# Patient Record
Sex: MALE | Race: WHITE | NOT HISPANIC OR LATINO | URBAN - METROPOLITAN AREA
[De-identification: names, ages, dates, MRNs, and addresses within clinical notes are randomized per-mention and may not be internally consistent; named-entity substitution may affect disease eponyms.]

---

## 2017-07-12 ENCOUNTER — OUTPATIENT (OUTPATIENT)
Dept: OUTPATIENT SERVICES | Facility: HOSPITAL | Age: 82
LOS: 1 days | End: 2017-07-12
Payer: COMMERCIAL

## 2017-07-12 DIAGNOSIS — R07.9 CHEST PAIN, UNSPECIFIED: ICD-10-CM

## 2017-07-12 PROCEDURE — 78452 HT MUSCLE IMAGE SPECT MULT: CPT

## 2017-07-12 PROCEDURE — 93017 CV STRESS TEST TRACING ONLY: CPT

## 2017-07-12 PROCEDURE — A9500: CPT

## 2017-07-12 PROCEDURE — 93018 CV STRESS TEST I&R ONLY: CPT

## 2017-07-12 PROCEDURE — 78452 HT MUSCLE IMAGE SPECT MULT: CPT | Mod: 26

## 2017-07-12 PROCEDURE — 93016 CV STRESS TEST SUPVJ ONLY: CPT

## 2018-02-05 PROBLEM — Z00.00 ENCOUNTER FOR PREVENTIVE HEALTH EXAMINATION: Status: ACTIVE | Noted: 2018-02-05

## 2018-03-15 ENCOUNTER — APPOINTMENT (OUTPATIENT)
Dept: ELECTROPHYSIOLOGY | Facility: CLINIC | Age: 83
End: 2018-03-15
Payer: MEDICARE

## 2018-03-15 ENCOUNTER — NON-APPOINTMENT (OUTPATIENT)
Age: 83
End: 2018-03-15

## 2018-03-15 VITALS
WEIGHT: 170 LBS | DIASTOLIC BLOOD PRESSURE: 75 MMHG | HEIGHT: 70 IN | BODY MASS INDEX: 24.34 KG/M2 | HEART RATE: 53 BPM | SYSTOLIC BLOOD PRESSURE: 138 MMHG | OXYGEN SATURATION: 95 %

## 2018-03-15 DIAGNOSIS — R55 SYNCOPE AND COLLAPSE: ICD-10-CM

## 2018-03-15 DIAGNOSIS — R42 DIZZINESS AND GIDDINESS: ICD-10-CM

## 2018-03-15 DIAGNOSIS — Z86.39 PERSONAL HISTORY OF OTHER ENDOCRINE, NUTRITIONAL AND METABOLIC DISEASE: ICD-10-CM

## 2018-03-15 DIAGNOSIS — Z78.9 OTHER SPECIFIED HEALTH STATUS: ICD-10-CM

## 2018-03-15 DIAGNOSIS — Z87.891 PERSONAL HISTORY OF NICOTINE DEPENDENCE: ICD-10-CM

## 2018-03-15 DIAGNOSIS — Z86.61 PERSONAL HISTORY OF INFECTIONS OF THE CENTRAL NERVOUS SYSTEM: ICD-10-CM

## 2018-03-15 PROCEDURE — 99205 OFFICE O/P NEW HI 60 MIN: CPT

## 2018-03-15 PROCEDURE — 93000 ELECTROCARDIOGRAM COMPLETE: CPT

## 2018-05-04 ENCOUNTER — OUTPATIENT (OUTPATIENT)
Dept: OUTPATIENT SERVICES | Facility: HOSPITAL | Age: 83
LOS: 1 days | Discharge: HOME | End: 2018-05-04
Payer: COMMERCIAL

## 2018-05-04 ENCOUNTER — TRANSCRIPTION ENCOUNTER (OUTPATIENT)
Age: 83
End: 2018-05-04

## 2018-05-04 VITALS
SYSTOLIC BLOOD PRESSURE: 126 MMHG | DIASTOLIC BLOOD PRESSURE: 65 MMHG | RESPIRATION RATE: 16 BRPM | HEART RATE: 67 BPM | OXYGEN SATURATION: 98 % | TEMPERATURE: 98 F

## 2018-05-04 DIAGNOSIS — R55 SYNCOPE AND COLLAPSE: ICD-10-CM

## 2018-05-04 PROCEDURE — C1764: CPT

## 2018-05-04 PROCEDURE — 33282: CPT

## 2018-05-04 RX ORDER — CEPHALEXIN 500 MG
1 CAPSULE ORAL
Qty: 0 | Refills: 0 | COMMUNITY
Start: 2018-05-04

## 2018-05-04 RX ORDER — CEPHALEXIN 500 MG
500 CAPSULE ORAL ONCE
Qty: 0 | Refills: 0 | Status: COMPLETED | OUTPATIENT
Start: 2018-05-04 | End: 2018-05-04

## 2018-05-04 RX ADMIN — Medication 500 MILLIGRAM(S): at 08:30

## 2018-05-04 NOTE — DISCHARGE NOTE ADULT - HOSPITAL COURSE
82 year old gentleman with history of DM, HTN, CKD stage II, prior meningitis 2yrs ago, RA on prednisone, chronic LE edema/lymphedema, presenting for evaluation of syncope and recurrent near syncope. He describes symptoms onset with pressure in his head, and one episode of william syncope. No arrhythmias were identified on initial workup, but subsequently Holter revealed brief NSVT and runs of atypical SVT though this did not correlate with symptoms. He presented electively 5/4/18 and         ILR implant for longitudinal rhythm monitoring and symptom correlation. 82 year old gentleman with history of DM, HTN, CKD stage II, prior meningitis 2yrs ago, RA on prednisone, chronic LE edema/lymphedema, presenting for evaluation of syncope and recurrent near syncope. He describes symptoms onset with pressure in his head, and one episode of william syncope. No arrhythmias were identified on initial workup, but subsequently Holter revealed brief NSVT and runs of atypical SVT though this did not correlate with symptoms. He presented electively 5/4/18 and underwent ILR implant for longitudinal rhythm monitoring and symptom correlation. 82 year old gentleman with history of DM, HTN, CKD stage II, prior meningitis 2yrs ago, RA on prednisone, chronic LE edema/lymphedema, presenting for evaluation of syncope and recurrent near syncope. He describes symptoms onset with pressure in his head, and one episode of william syncope. No arrhythmias were identified on initial workup, but subsequently Holter revealed brief NSVT and runs of atypical SVT though this did not correlate with symptoms. He presented electively 5/4/18 and underwent uncomplicated ILR implant for longitudinal rhythm monitoring and symptom correlation. The patient was observed per post procedure protocol and discharged home with a plan for outpatient follow up.

## 2018-05-04 NOTE — DISCHARGE NOTE ADULT - PLAN OF CARE
rhythm monitoring and symptomatic correlation Loop Recorder Incision Care:     - Remove the plastic and gauze dressing after 24 hours.   - Do not touch the incision until it is completely healed.   - There is Dermabond (skin glue) on your incision, which will start to flake off on its own over the next 2-3 weeks. Do not pick at or peal off the Dermabond.   - Do not apply soaps, creams, lotions, ointments or powders to the incision until it is completely healed.  - You should call the doctor if you notice redness, drainage, swelling, increased tenderness, hot sensation around the  incision, bleeding or incision edges pulling apart.

## 2018-05-04 NOTE — H&P PST ADULT - ASSESSMENT
82 year old gentleman with history of DM, HTN, CKD stage II, prior meningitis 2yrs ago, RA on prednisone, chronic LE edema/lymphedema, presenting for evaluation of syncope and recurrent near syncope. He describes symptoms onset with pressure in his head, and one episode of william syncope. No arrhythmias were identified on initial workup, but subsequently Holter revealed brief NSVT and runs of atypical SVT though this did not correlate with symptoms. He presents in anticipation of ILR implant for longitudinal rhythm monitoring and symptom correlation.  Plan:   Keflex 500mg PO x 1 dose now.   Consent w/ Dr. Hodge.   Chest prep per protocol.   Discharge teaching initiated.

## 2018-05-04 NOTE — H&P PST ADULT - PMH
Chronic back pain    Diabetes    Enlarged prostate    GERD (gastroesophageal reflux disease)    High cholesterol    History of hypertension    Nerve damage    Peripheral neuropathy    Rheumatoid arthritis    Skin cancer    Syncope, unspecified syncope type

## 2018-05-04 NOTE — DISCHARGE NOTE ADULT - PATIENT PORTAL LINK FT
You can access the [facility] Patient Portal, offered by [facility], by registering with the following website: [email or web address]

## 2018-05-04 NOTE — DISCHARGE NOTE ADULT - CARE PROVIDERS DIRECT ADDRESSES
[email or web address]

## 2018-05-04 NOTE — DISCHARGE NOTE ADULT - CARE PROVIDER_API CALL
Kenny Hodge), Cardiology; Internal Medicine  [address] [place] [address]  Phone: [phone]  Fax: [phone]

## 2018-05-04 NOTE — DISCHARGE NOTE ADULT - CARE PLAN
Principal Discharge DX:	Syncope, unspecified syncope type  Goal:	rhythm monitoring and symptomatic correlation  Assessment and plan of treatment:	Loop Recorder Incision Care:     - Remove the plastic and gauze dressing after 24 hours.   - Do not touch the incision until it is completely healed.   - There is Dermabond (skin glue) on your incision, which will start to flake off on its own over the next 2-3 weeks. Do not pick at or peal off the Dermabond.   - Do not apply soaps, creams, lotions, ointments or powders to the incision until it is completely healed.  - You should call the doctor if you notice redness, drainage, swelling, increased tenderness, hot sensation around the  incision, bleeding or incision edges pulling apart.

## 2018-05-04 NOTE — DISCHARGE NOTE ADULT - INSTRUCTIONS
Choose lean meats and poultry without skin and prepare them without added saturated and trans fat.  Eat fish at least twice a week. Recent research shows that eating oily fish containing omega-3 fatty acids (for example, salmon, trout and herring) may help lower your risk of death from coronary artery disease.  Select fat-free, 1 percent fat and low-fat dairy products.  Cut back on foods containing partially hydrogenated vegetable oils to reduce trans fat in your diet.   To lower cholesterol, reduce saturated fat to no more than 5 to 6 percent of total calories. For someone eating 2,000 calories a day, that’s about 13 grams of saturated fat.  Cut back on beverages and foods with added sugars.  Choose and prepare foods with little or no salt. To lower blood pressure, aim to eat no more than 2,400 milligrams of sodium per day. Reducing daily intake to 1,500 mg is desirable because it can lower blood pressure even further.  If you drink alcohol, drink in moderation. That means one drink per day if you’re a woman and two drinks  per day if you’re a man.  Follow the American Heart Association recommendations when you eat out, and keep an eye on your portion sizes.   With a diagnosis of diabetes comes a strict diet regimen to help control blood sugars by watching carbohydrate consumption. Carbohydrates, such as starches, fruits, dairy and starchy vegetables, is the food group that affects glucose levels in the body. Carefully monitoring carbohydrate intake is a main component of the diabetic diet; eating three meals each day that are roughly equivalent in size can help control blood sugars. A registered dietitian can help you plan a diet customized to your individual needs.

## 2018-05-04 NOTE — H&P PST ADULT - HISTORY OF PRESENT ILLNESS
82 year old gentleman with history of DM, HTN, CKD stage II, prior meningitis 2yrs ago, RA on prednisone, chronic LE edema/lymphedema, presenting for evaluation of syncope. He had an episode of syncope in 10/2017, at which time he developed sudden pressure in his head, then fell over with loss of consciousness. He does remember falling, but believes he did pass out. He underwent workup at [facility], where TTE and Carotid ultrasound was unremarkable. Head CT revealed white matter disease and age appropriate changes. Since that time he has had recurrent episodes of sudden pressure in his head with a presyncope feeling where he has to sit down, but the symptoms resolve in about 5 minutes. He denies any palpitation, lightheadedness or other syncope. He does feel mildly lightheaded when he stands up quickly.     Holter monitor 1/18/18 revealed sinus rhythm  bpm, with rare PVCs and one run of NSVT for 3 beats at 171 bpm, and frequent runs of SVT up to 44 beats at 143 bpm. The SVT episodes were regular long RP tachycardia with superiorly directed P-wave axis. He denies any symptoms while wearing the monitor.     Of note he is on multiple medications for neuropathy including lyrica, and recently started duloxetine. He also takes Lasix which was started for LE edema, which has been very mild. He has not had neurologic evaluation since his meningitis episode. He presents in anticipation of ILR implant for longitudinal rhythm monitoring and symptom correlation. Patient currently denies chest pain, shortness of breath at rest or with exertion, palpitation, fevers/chills, N/V/D, or other cardiac or constitutional symptoms.     Stress Test: 7/12/17, normal, unable to exercise/ walks with walker. Small mild defect in basal inferior wall predominantly fixed suggestive of attenuation artifact. LVEF 59%.   Echo: 12/13/17, LVEF 60%, nml biatrial size, mild MR, mod TR

## 2018-05-17 ENCOUNTER — APPOINTMENT (OUTPATIENT)
Dept: ELECTROPHYSIOLOGY | Facility: CLINIC | Age: 83
End: 2018-05-17
Payer: MEDICARE

## 2018-05-17 VITALS
HEIGHT: 70 IN | HEART RATE: 80 BPM | BODY MASS INDEX: 26.2 KG/M2 | DIASTOLIC BLOOD PRESSURE: 78 MMHG | WEIGHT: 183 LBS | SYSTOLIC BLOOD PRESSURE: 134 MMHG

## 2018-05-17 DIAGNOSIS — Z95.818 PRESENCE OF OTHER CARDIAC IMPLANTS AND GRAFTS: ICD-10-CM

## 2018-05-17 DIAGNOSIS — I47.2 VENTRICULAR TACHYCARDIA: ICD-10-CM

## 2018-05-17 DIAGNOSIS — Z86.79 PERSONAL HISTORY OF OTHER DISEASES OF THE CIRCULATORY SYSTEM: ICD-10-CM

## 2018-05-17 PROCEDURE — 99024 POSTOP FOLLOW-UP VISIT: CPT

## 2018-06-11 ENCOUNTER — APPOINTMENT (OUTPATIENT)
Age: 83
End: 2018-06-11
Payer: MEDICARE

## 2018-06-11 PROCEDURE — 93299: CPT

## 2018-06-11 PROCEDURE — 93298 REM INTERROG DEV EVAL SCRMS: CPT

## 2018-06-20 ENCOUNTER — INPATIENT (INPATIENT)
Facility: HOSPITAL | Age: 83
LOS: 1 days | Discharge: HOME HEALTH | DRG: 683 | End: 2018-06-22
Attending: INTERNAL MEDICINE | Admitting: INTERNAL MEDICINE
Payer: COMMERCIAL

## 2018-06-20 VITALS
TEMPERATURE: 98 F | HEIGHT: 67 IN | DIASTOLIC BLOOD PRESSURE: 68 MMHG | WEIGHT: 164.91 LBS | HEART RATE: 66 BPM | SYSTOLIC BLOOD PRESSURE: 100 MMHG | RESPIRATION RATE: 16 BRPM | OXYGEN SATURATION: 97 %

## 2018-06-20 DIAGNOSIS — J18.9 PNEUMONIA, UNSPECIFIED ORGANISM: ICD-10-CM

## 2018-06-20 LAB
ALBUMIN SERPL ELPH-MCNC: 3.8 G/DL — SIGNIFICANT CHANGE UP (ref 3.3–5.2)
ALP SERPL-CCNC: 53 U/L — SIGNIFICANT CHANGE UP (ref 40–120)
ALT FLD-CCNC: 6 U/L — SIGNIFICANT CHANGE UP
ANION GAP SERPL CALC-SCNC: 19 MMOL/L — HIGH (ref 5–17)
APPEARANCE UR: CLEAR — SIGNIFICANT CHANGE UP
AST SERPL-CCNC: 15 U/L — SIGNIFICANT CHANGE UP
BASOPHILS # BLD AUTO: 0.1 K/UL — SIGNIFICANT CHANGE UP (ref 0–0.2)
BASOPHILS NFR BLD AUTO: 0.6 % — SIGNIFICANT CHANGE UP (ref 0–2)
BILIRUB SERPL-MCNC: 0.3 MG/DL — LOW (ref 0.4–2)
BILIRUB UR-MCNC: NEGATIVE — SIGNIFICANT CHANGE UP
BUN SERPL-MCNC: 31 MG/DL — HIGH (ref 8–20)
CALCIUM SERPL-MCNC: 9.1 MG/DL — SIGNIFICANT CHANGE UP (ref 8.6–10.2)
CHLORIDE SERPL-SCNC: 96 MMOL/L — LOW (ref 98–107)
CO2 SERPL-SCNC: 22 MMOL/L — SIGNIFICANT CHANGE UP (ref 22–29)
COLOR SPEC: YELLOW — SIGNIFICANT CHANGE UP
CREAT SERPL-MCNC: 1.69 MG/DL — HIGH (ref 0.5–1.3)
DIFF PNL FLD: NEGATIVE — SIGNIFICANT CHANGE UP
EOSINOPHIL # BLD AUTO: 0.4 K/UL — SIGNIFICANT CHANGE UP (ref 0–0.5)
EOSINOPHIL NFR BLD AUTO: 3.5 % — SIGNIFICANT CHANGE UP (ref 0–6)
GLUCOSE BLDC GLUCOMTR-MCNC: 147 MG/DL — HIGH (ref 70–99)
GLUCOSE BLDC GLUCOMTR-MCNC: 188 MG/DL — HIGH (ref 70–99)
GLUCOSE SERPL-MCNC: 132 MG/DL — HIGH (ref 70–115)
GLUCOSE UR QL: NEGATIVE MG/DL — SIGNIFICANT CHANGE UP
HCT VFR BLD CALC: 34.5 % — LOW (ref 42–52)
HGB BLD-MCNC: 11.5 G/DL — LOW (ref 14–18)
KETONES UR-MCNC: ABNORMAL
LEUKOCYTE ESTERASE UR-ACNC: NEGATIVE — SIGNIFICANT CHANGE UP
LYMPHOCYTES # BLD AUTO: 1.8 K/UL — SIGNIFICANT CHANGE UP (ref 1–4.8)
LYMPHOCYTES # BLD AUTO: 16.6 % — LOW (ref 20–55)
MCHC RBC-ENTMCNC: 31.2 PG — HIGH (ref 27–31)
MCHC RBC-ENTMCNC: 33.3 G/DL — SIGNIFICANT CHANGE UP (ref 32–36)
MCV RBC AUTO: 93.5 FL — SIGNIFICANT CHANGE UP (ref 80–94)
MONOCYTES # BLD AUTO: 1.4 K/UL — HIGH (ref 0–0.8)
MONOCYTES NFR BLD AUTO: 12.8 % — HIGH (ref 3–10)
NEUTROPHILS # BLD AUTO: 7.3 K/UL — SIGNIFICANT CHANGE UP (ref 1.8–8)
NEUTROPHILS NFR BLD AUTO: 66.1 % — SIGNIFICANT CHANGE UP (ref 37–73)
NITRITE UR-MCNC: NEGATIVE — SIGNIFICANT CHANGE UP
NT-PROBNP SERPL-SCNC: 181 PG/ML — SIGNIFICANT CHANGE UP (ref 0–300)
PH UR: 5 — SIGNIFICANT CHANGE UP (ref 5–8)
PLATELET # BLD AUTO: 252 K/UL — SIGNIFICANT CHANGE UP (ref 150–400)
POTASSIUM SERPL-MCNC: 3.9 MMOL/L — SIGNIFICANT CHANGE UP (ref 3.5–5.3)
POTASSIUM SERPL-SCNC: 3.9 MMOL/L — SIGNIFICANT CHANGE UP (ref 3.5–5.3)
PROT SERPL-MCNC: 6.8 G/DL — SIGNIFICANT CHANGE UP (ref 6.6–8.7)
PROT UR-MCNC: 15 MG/DL
RBC # BLD: 3.69 M/UL — LOW (ref 4.6–6.2)
RBC # FLD: 14.3 % — SIGNIFICANT CHANGE UP (ref 11–15.6)
SODIUM SERPL-SCNC: 137 MMOL/L — SIGNIFICANT CHANGE UP (ref 135–145)
SP GR SPEC: 1.01 — SIGNIFICANT CHANGE UP (ref 1.01–1.02)
TROPONIN T SERPL-MCNC: 0.03 NG/ML — SIGNIFICANT CHANGE UP (ref 0–0.06)
TROPONIN T SERPL-MCNC: 0.04 NG/ML — SIGNIFICANT CHANGE UP (ref 0–0.06)
UROBILINOGEN FLD QL: NEGATIVE MG/DL — SIGNIFICANT CHANGE UP
WBC # BLD: 11.1 K/UL — HIGH (ref 4.8–10.8)
WBC # FLD AUTO: 11.1 K/UL — HIGH (ref 4.8–10.8)

## 2018-06-20 PROCEDURE — 99291 CRITICAL CARE FIRST HOUR: CPT

## 2018-06-20 PROCEDURE — 99223 1ST HOSP IP/OBS HIGH 75: CPT

## 2018-06-20 PROCEDURE — 71250 CT THORAX DX C-: CPT | Mod: 26

## 2018-06-20 PROCEDURE — 93306 TTE W/DOPPLER COMPLETE: CPT | Mod: 26

## 2018-06-20 PROCEDURE — 93010 ELECTROCARDIOGRAM REPORT: CPT

## 2018-06-20 PROCEDURE — 71045 X-RAY EXAM CHEST 1 VIEW: CPT | Mod: 26

## 2018-06-20 RX ORDER — CEFTRIAXONE 500 MG/1
1 INJECTION, POWDER, FOR SOLUTION INTRAMUSCULAR; INTRAVENOUS EVERY 24 HOURS
Qty: 0 | Refills: 0 | Status: DISCONTINUED | OUTPATIENT
Start: 2018-06-20 | End: 2018-06-20

## 2018-06-20 RX ORDER — DEXTROSE 50 % IN WATER 50 %
25 SYRINGE (ML) INTRAVENOUS ONCE
Qty: 0 | Refills: 0 | Status: DISCONTINUED | OUTPATIENT
Start: 2018-06-20 | End: 2018-06-22

## 2018-06-20 RX ORDER — ACETAMINOPHEN 500 MG
650 TABLET ORAL EVERY 6 HOURS
Qty: 0 | Refills: 0 | Status: DISCONTINUED | OUTPATIENT
Start: 2018-06-20 | End: 2018-06-22

## 2018-06-20 RX ORDER — DEXTROSE 50 % IN WATER 50 %
15 SYRINGE (ML) INTRAVENOUS ONCE
Qty: 0 | Refills: 0 | Status: DISCONTINUED | OUTPATIENT
Start: 2018-06-20 | End: 2018-06-22

## 2018-06-20 RX ORDER — HEPARIN SODIUM 5000 [USP'U]/ML
5000 INJECTION INTRAVENOUS; SUBCUTANEOUS EVERY 8 HOURS
Qty: 0 | Refills: 0 | Status: DISCONTINUED | OUTPATIENT
Start: 2018-06-20 | End: 2018-06-22

## 2018-06-20 RX ORDER — INSULIN LISPRO 100/ML
VIAL (ML) SUBCUTANEOUS
Qty: 0 | Refills: 0 | Status: DISCONTINUED | OUTPATIENT
Start: 2018-06-20 | End: 2018-06-22

## 2018-06-20 RX ORDER — SODIUM CHLORIDE 9 MG/ML
1000 INJECTION, SOLUTION INTRAVENOUS
Qty: 0 | Refills: 0 | Status: DISCONTINUED | OUTPATIENT
Start: 2018-06-20 | End: 2018-06-22

## 2018-06-20 RX ORDER — GLUCAGON INJECTION, SOLUTION 0.5 MG/.1ML
1 INJECTION, SOLUTION SUBCUTANEOUS ONCE
Qty: 0 | Refills: 0 | Status: DISCONTINUED | OUTPATIENT
Start: 2018-06-20 | End: 2018-06-22

## 2018-06-20 RX ORDER — SODIUM CHLORIDE 9 MG/ML
1000 INJECTION INTRAMUSCULAR; INTRAVENOUS; SUBCUTANEOUS
Qty: 0 | Refills: 0 | Status: DISCONTINUED | OUTPATIENT
Start: 2018-06-20 | End: 2018-06-22

## 2018-06-20 RX ORDER — SIMVASTATIN 20 MG/1
20 TABLET, FILM COATED ORAL AT BEDTIME
Qty: 0 | Refills: 0 | Status: DISCONTINUED | OUTPATIENT
Start: 2018-06-20 | End: 2018-06-22

## 2018-06-20 RX ORDER — DULOXETINE HYDROCHLORIDE 30 MG/1
30 CAPSULE, DELAYED RELEASE ORAL DAILY
Qty: 0 | Refills: 0 | Status: DISCONTINUED | OUTPATIENT
Start: 2018-06-20 | End: 2018-06-22

## 2018-06-20 RX ORDER — AZITHROMYCIN 500 MG/1
500 TABLET, FILM COATED ORAL ONCE
Qty: 0 | Refills: 0 | Status: COMPLETED | OUTPATIENT
Start: 2018-06-20 | End: 2018-06-20

## 2018-06-20 RX ORDER — ASPIRIN/CALCIUM CARB/MAGNESIUM 324 MG
81 TABLET ORAL DAILY
Qty: 0 | Refills: 0 | Status: DISCONTINUED | OUTPATIENT
Start: 2018-06-20 | End: 2018-06-22

## 2018-06-20 RX ORDER — IPRATROPIUM/ALBUTEROL SULFATE 18-103MCG
3 AEROSOL WITH ADAPTER (GRAM) INHALATION EVERY 6 HOURS
Qty: 0 | Refills: 0 | Status: DISCONTINUED | OUTPATIENT
Start: 2018-06-20 | End: 2018-06-22

## 2018-06-20 RX ORDER — SODIUM CHLORIDE 9 MG/ML
500 INJECTION INTRAMUSCULAR; INTRAVENOUS; SUBCUTANEOUS ONCE
Qty: 0 | Refills: 0 | Status: COMPLETED | OUTPATIENT
Start: 2018-06-20 | End: 2018-06-20

## 2018-06-20 RX ORDER — DEXTROSE 50 % IN WATER 50 %
12.5 SYRINGE (ML) INTRAVENOUS ONCE
Qty: 0 | Refills: 0 | Status: DISCONTINUED | OUTPATIENT
Start: 2018-06-20 | End: 2018-06-22

## 2018-06-20 RX ORDER — CEFTRIAXONE 500 MG/1
1 INJECTION, POWDER, FOR SOLUTION INTRAMUSCULAR; INTRAVENOUS ONCE
Qty: 0 | Refills: 0 | Status: COMPLETED | OUTPATIENT
Start: 2018-06-20 | End: 2018-06-20

## 2018-06-20 RX ORDER — INSULIN GLARGINE 100 [IU]/ML
15 INJECTION, SOLUTION SUBCUTANEOUS AT BEDTIME
Qty: 0 | Refills: 0 | Status: DISCONTINUED | OUTPATIENT
Start: 2018-06-20 | End: 2018-06-22

## 2018-06-20 RX ORDER — TAMSULOSIN HYDROCHLORIDE 0.4 MG/1
0.4 CAPSULE ORAL AT BEDTIME
Qty: 0 | Refills: 0 | Status: DISCONTINUED | OUTPATIENT
Start: 2018-06-20 | End: 2018-06-22

## 2018-06-20 RX ORDER — PANTOPRAZOLE SODIUM 20 MG/1
40 TABLET, DELAYED RELEASE ORAL
Qty: 0 | Refills: 0 | Status: DISCONTINUED | OUTPATIENT
Start: 2018-06-20 | End: 2018-06-22

## 2018-06-20 RX ADMIN — INSULIN GLARGINE 15 UNIT(S): 100 INJECTION, SOLUTION SUBCUTANEOUS at 22:18

## 2018-06-20 RX ADMIN — CEFTRIAXONE 100 GRAM(S): 500 INJECTION, POWDER, FOR SOLUTION INTRAMUSCULAR; INTRAVENOUS at 15:03

## 2018-06-20 RX ADMIN — HEPARIN SODIUM 5000 UNIT(S): 5000 INJECTION INTRAVENOUS; SUBCUTANEOUS at 21:51

## 2018-06-20 RX ADMIN — SODIUM CHLORIDE 100 MILLILITER(S): 9 INJECTION INTRAMUSCULAR; INTRAVENOUS; SUBCUTANEOUS at 23:45

## 2018-06-20 RX ADMIN — TAMSULOSIN HYDROCHLORIDE 0.4 MILLIGRAM(S): 0.4 CAPSULE ORAL at 21:50

## 2018-06-20 RX ADMIN — SODIUM CHLORIDE 100 MILLILITER(S): 9 INJECTION INTRAMUSCULAR; INTRAVENOUS; SUBCUTANEOUS at 16:46

## 2018-06-20 RX ADMIN — AZITHROMYCIN 255 MILLIGRAM(S): 500 TABLET, FILM COATED ORAL at 16:46

## 2018-06-20 RX ADMIN — Medication 2: at 18:33

## 2018-06-20 RX ADMIN — SIMVASTATIN 20 MILLIGRAM(S): 20 TABLET, FILM COATED ORAL at 21:50

## 2018-06-20 RX ADMIN — SODIUM CHLORIDE 1000 MILLILITER(S): 9 INJECTION INTRAMUSCULAR; INTRAVENOUS; SUBCUTANEOUS at 12:51

## 2018-06-20 RX ADMIN — Medication 75 MILLIGRAM(S): at 20:24

## 2018-06-20 NOTE — H&P ADULT - ASSESSMENT
This is 83Y man with PMH of DM, HTN, BPH, GERD, HLD, RA, diabetes peripheral neuropathy, pulmonary fibrosis was sent from PMD office due to near syncope. Per patient he went to see primary care, in office noted to have hypotensive 90/60 and also c/o dizziness and felt like he is going to passed out, called 911 and send to ER.  Work up showed Bun 31, Cr1.69, x-ray chest Increased reticulations in the bilateral lower lobes left greater than  right more prominent than the prior exam may represent more progressive  fibrosis, clinically correlate for superimposed left lower lobe pneumonia. Of Note pt is s/p syncope 05/2018, seen by cardiology,  s/p Loop recorder on last admission     A/P    >Near syncope - likely due to dehydration   admit to medicine   orthostatic vitals  gentle hydration   serial troponin  TTE   Pt eval  cardiology consulted by ED - s/p Loop 05/2018    >Xray suggestive of pneumonia  - doubt - probably underlying pulmonary fibrosis   c/w Rocephin, azithromycin awaiting CT chest   RVP   crackles are likely due to pulmonary fibrosis     >THOM - no previous labs to compare   c/w IVF, f/u BMP in am   holding ARB     >HTN - hold antihypertensive, monitor BP    >DM complicated with peripheral neuropathy - c/w lantus and humalog  c/w Lyrica  A1c in am     >HLD- c/w statin   Lipid profile in am     >Pulmonary fibrosis - c/w Prednisone     >RA- c/w home medication     >GERD - PPI    >DVT PPX- Heparin This is 83Y man with PMH of DM, HTN, BPH, GERD, HLD, RA, diabetes peripheral neuropathy, pulmonary fibrosis was sent from PMD office due to near syncope. Per patient he went to see primary care, in office noted to have hypotensive 90/60 and also c/o dizziness and felt like he is going to passed out, called 911 and send to ER.  Work up showed Bun 31, Cr1.69, x-ray chest Increased reticulations in the bilateral lower lobes left greater than  right more prominent than the prior exam may represent more progressive  fibrosis, clinically correlate for superimposed left lower lobe pneumonia. Of Note pt is s/p syncope 05/2018, seen by cardiology,  s/p Loop recorder on last admission     A/P    >Near syncope - likely due to dehydration   admit to medicine   orthostatic vitals  gentle hydration   serial troponin  TTE   Pt eval  cardiology consulted by ED - s/p Loop 05/2018    >Xray suggestive of pneumonia  - doubt - probably underlying pulmonary fibrosis   c/w Rocephin, azithromycin awaiting CT chest   RVP   crackles are likely due to pulmonary fibrosis     >THOM - no previous labs to compare   c/w IVF, f/u BMP in am   holding ARB     >HTN - hold antihypertensive, monitor BP    >DM complicated with peripheral neuropathy - c/w lantus and humalog  c/w Lyrica  A1c in am     >HLD- c/w statin   Lipid profile in am     >Pulmonary fibrosis - c/w Prednisone     >RA- c/w home medication     >GERD - PPI    >BPH- c/w Flomax    >DVT PPX- Heparin This is 83Y man with PMH of DM, HTN, BPH, GERD, HLD, RA, diabetes peripheral neuropathy, pulmonary fibrosis was sent from PMD office due to near syncope. Per patient he went to see primary care, in office noted to have hypotensive 90/60 and also c/o dizziness and felt like he is going to passed out, called 911 and send to ER.  Work up showed Bun 31, Cr1.69, x-ray chest Increased reticulations in the bilateral lower lobes left greater than  right more prominent than the prior exam may represent more progressive  fibrosis, clinically correlate for superimposed left lower lobe pneumonia. Of Note pt is s/p syncope 05/2018, seen by cardiology,  s/p Loop recorder on last admission     A/P    >Near syncope - likely due to dehydration   admit to medicine   orthostatic vitals  gentle hydration   serial troponin  TTE   Pt eval  cardiology consulted by ED - s/p Loop 05/2018    >Xray suggestive of pneumonia  - doubt - probably underlying pulmonary fibrosis   received 1 dose of Rocephin, azithromycin in ED  hold Abx for now awaiting CT chest   RVP   crackles are likely due to pulmonary fibrosis     >THOM - no previous labs to compare   c/w IVF, f/u BMP in am   holding ARB     >HTN - hold antihypertensive, monitor BP    >DM complicated with peripheral neuropathy - c/w lantus and humalog  c/w Lyrica  A1c in am     >HLD- c/w statin   Lipid profile in am     >Pulmonary fibrosis - c/w Prednisone     >RA- c/w home medication     >GERD - PPI    >BPH- c/w Flomax    >DVT PPX- Heparin

## 2018-06-20 NOTE — ED PROVIDER NOTE - OBJECTIVE STATEMENT
84 y/o M pt  with hx of diabetes, HTN, HLD, presents to ED BIBA s/p near-syncopal episode. Pt was a this primary care office when he was being weighed started feeling lightheaded, diaphoretic; he sat down on his walker. Pt noted to having low blood pressure at that time. Similar episodes of just dizziness when he gets up with walk with his walker. No prior hx of syncope. Denies head injury. Denies chest pain, SOB, abdominal pain, nausea, vomiting. no further complaints at this time. 84 y/o M pt  with hx of diabetes, HTN, HLD, presents to ED BIBA s/p near-syncopal episode. Pt was a this primary care office when he was being weighed started feeling lightheaded, diaphoretic; he sat down on his walker. Pt noted to having low blood pressure at that time. Similar episodes of just dizziness when he gets up with walk with his walker. Denies head injury. Denies chest pain, SOB, abdominal pain, nausea, vomiting. no further complaints at this time.

## 2018-06-20 NOTE — H&P ADULT - HISTORY OF PRESENT ILLNESS
This is 83Y man with PMH of DM, HTN, BPH, GERD, HLD, RA, diabetes peripheral neuropathy, pulmonary fibrosis was sent from PMD office due to near syncope. Per patient he went to see primary care, in office noted to have hypotensive 90/60 and also c/o dizziness and felt like he is going to passed out, called 911 and send to ER. In ER he was noted to be hypotensive, receive 1L NS bolus. At the time of exam he report he is feeling better, poor oral intake for lat few days. He also c/o cough, denied chest pain, fever, chills. Work up showed Bun 31, Cr1.69, x-ray chest Increased reticulations in the bilateral lower lobes left greater than  right more prominent than the prior exam may represent more progressive  fibrosis, clinically correlate for superimposed left lower lobe pneumonia.    Of Note pt is s/p syncope 05/2018, seen by cardiology,  s/p Loop recorder on last admission     Denied chest pain, SOB, palpitation, abd. pain, nausea, vomiting, headache, numbness, tingling, urinary and bowel complaints.

## 2018-06-20 NOTE — ED ADULT TRIAGE NOTE - CHIEF COMPLAINT QUOTE
pt presents to ED with near syncope while at MD office. pt denies chest pain/sob. breathing is even and unlabored. pt was hypotensive in MD office systolic pressure in the 90's.

## 2018-06-20 NOTE — H&P ADULT - NSHPPHYSICALEXAM_GEN_ALL_CORE
ICU Vital Signs Last 24 Hrs  T(C): 36.5 (20 Jun 2018 12:01), Max: 36.5 (20 Jun 2018 12:01)  T(F): 97.7 (20 Jun 2018 12:01), Max: 97.7 (20 Jun 2018 12:01)  HR: 66 (20 Jun 2018 12:01) (66 - 66)  BP: 100/68 (20 Jun 2018 12:01) (100/68 - 100/68)  BP(mean): --  ABP: --  ABP(mean): --  RR: 16 (20 Jun 2018 12:01) (16 - 16)  SpO2: 97% (20 Jun 2018 12:01) (97% - 97%)

## 2018-06-20 NOTE — ED ADULT NURSE NOTE - OBJECTIVE STATEMENT
Got clammy, pale and shakey, dizzy.  Patient states he feels "good" now.  Patient A&Ox3, No chest pain or respiratory distress.

## 2018-06-20 NOTE — PROVIDER CONTACT NOTE (MEDICATION) - ACTION/TREATMENT ORDERED:
pt in echo on monitor. order for difinity obtained. Difinity IV push as per protocol. Pt had no adverse effects or symptoms

## 2018-06-21 ENCOUNTER — APPOINTMENT (OUTPATIENT)
Dept: ELECTROPHYSIOLOGY | Facility: CLINIC | Age: 83
End: 2018-06-21

## 2018-06-21 LAB
ANION GAP SERPL CALC-SCNC: 13 MMOL/L — SIGNIFICANT CHANGE UP (ref 5–17)
BUN SERPL-MCNC: 21 MG/DL — HIGH (ref 8–20)
CALCIUM SERPL-MCNC: 8.3 MG/DL — LOW (ref 8.6–10.2)
CHLORIDE SERPL-SCNC: 106 MMOL/L — SIGNIFICANT CHANGE UP (ref 98–107)
CHOLEST SERPL-MCNC: 73 MG/DL — LOW (ref 110–199)
CO2 SERPL-SCNC: 23 MMOL/L — SIGNIFICANT CHANGE UP (ref 22–29)
CREAT SERPL-MCNC: 1.16 MG/DL — SIGNIFICANT CHANGE UP (ref 0.5–1.3)
GLUCOSE BLDC GLUCOMTR-MCNC: 124 MG/DL — HIGH (ref 70–99)
GLUCOSE BLDC GLUCOMTR-MCNC: 127 MG/DL — HIGH (ref 70–99)
GLUCOSE BLDC GLUCOMTR-MCNC: 136 MG/DL — HIGH (ref 70–99)
GLUCOSE BLDC GLUCOMTR-MCNC: 152 MG/DL — HIGH (ref 70–99)
GLUCOSE SERPL-MCNC: 117 MG/DL — HIGH (ref 70–115)
HBA1C BLD-MCNC: 6.4 % — HIGH (ref 4–5.6)
HCT VFR BLD CALC: 30.5 % — LOW (ref 42–52)
HDLC SERPL-MCNC: 28 MG/DL — LOW
HGB BLD-MCNC: 10.3 G/DL — LOW (ref 14–18)
LIPID PNL WITH DIRECT LDL SERPL: 15 MG/DL — SIGNIFICANT CHANGE UP
MCHC RBC-ENTMCNC: 31.8 PG — HIGH (ref 27–31)
MCHC RBC-ENTMCNC: 33.8 G/DL — SIGNIFICANT CHANGE UP (ref 32–36)
MCV RBC AUTO: 94.1 FL — HIGH (ref 80–94)
PLATELET # BLD AUTO: 221 K/UL — SIGNIFICANT CHANGE UP (ref 150–400)
POTASSIUM SERPL-MCNC: 3.9 MMOL/L — SIGNIFICANT CHANGE UP (ref 3.5–5.3)
POTASSIUM SERPL-SCNC: 3.9 MMOL/L — SIGNIFICANT CHANGE UP (ref 3.5–5.3)
RBC # BLD: 3.24 M/UL — LOW (ref 4.6–6.2)
RBC # FLD: 14.1 % — SIGNIFICANT CHANGE UP (ref 11–15.6)
SODIUM SERPL-SCNC: 142 MMOL/L — SIGNIFICANT CHANGE UP (ref 135–145)
TOTAL CHOLESTEROL/HDL RATIO MEASUREMENT: 3 RATIO — LOW (ref 3.4–9.6)
TRIGL SERPL-MCNC: 152 MG/DL — SIGNIFICANT CHANGE UP (ref 10–200)
TROPONIN T SERPL-MCNC: 0.03 NG/ML — SIGNIFICANT CHANGE UP (ref 0–0.06)
WBC # BLD: 9.5 K/UL — SIGNIFICANT CHANGE UP (ref 4.8–10.8)
WBC # FLD AUTO: 9.5 K/UL — SIGNIFICANT CHANGE UP (ref 4.8–10.8)

## 2018-06-21 PROCEDURE — 99223 1ST HOSP IP/OBS HIGH 75: CPT

## 2018-06-21 PROCEDURE — 99232 SBSQ HOSP IP/OBS MODERATE 35: CPT

## 2018-06-21 RX ORDER — METOPROLOL TARTRATE 50 MG
1 TABLET ORAL
Qty: 0 | Refills: 0 | COMMUNITY

## 2018-06-21 RX ADMIN — Medication 75 MILLIGRAM(S): at 18:14

## 2018-06-21 RX ADMIN — HEPARIN SODIUM 5000 UNIT(S): 5000 INJECTION INTRAVENOUS; SUBCUTANEOUS at 06:36

## 2018-06-21 RX ADMIN — DULOXETINE HYDROCHLORIDE 30 MILLIGRAM(S): 30 CAPSULE, DELAYED RELEASE ORAL at 11:44

## 2018-06-21 RX ADMIN — HEPARIN SODIUM 5000 UNIT(S): 5000 INJECTION INTRAVENOUS; SUBCUTANEOUS at 22:48

## 2018-06-21 RX ADMIN — Medication 2: at 11:43

## 2018-06-21 RX ADMIN — INSULIN GLARGINE 15 UNIT(S): 100 INJECTION, SOLUTION SUBCUTANEOUS at 22:48

## 2018-06-21 RX ADMIN — Medication 81 MILLIGRAM(S): at 11:44

## 2018-06-21 RX ADMIN — HEPARIN SODIUM 5000 UNIT(S): 5000 INJECTION INTRAVENOUS; SUBCUTANEOUS at 11:44

## 2018-06-21 RX ADMIN — PANTOPRAZOLE SODIUM 40 MILLIGRAM(S): 20 TABLET, DELAYED RELEASE ORAL at 06:36

## 2018-06-21 RX ADMIN — SIMVASTATIN 20 MILLIGRAM(S): 20 TABLET, FILM COATED ORAL at 22:48

## 2018-06-21 RX ADMIN — TAMSULOSIN HYDROCHLORIDE 0.4 MILLIGRAM(S): 0.4 CAPSULE ORAL at 22:48

## 2018-06-21 RX ADMIN — Medication 2.5 MILLIGRAM(S): at 06:35

## 2018-06-21 RX ADMIN — Medication 75 MILLIGRAM(S): at 06:35

## 2018-06-21 NOTE — PHYSICAL THERAPY INITIAL EVALUATION ADULT - ADDITIONAL COMMENTS
Pt reporting she lives with family at home, owns a uses a RW. Unclear if patient has steps to enter.

## 2018-06-21 NOTE — PHYSICAL THERAPY INITIAL EVALUATION ADULT - PERTINENT HX OF CURRENT PROBLEM, REHAB EVAL
pt presents to [facility] with reports of reoccurrence of syncopal episodes at home. Pt presents to [facility] with reports of

## 2018-06-21 NOTE — PROGRESS NOTE ADULT - ASSESSMENT
This is 83Y man with PMH of DM, HTN, BPH, GERD, HLD, RA, diabetes peripheral neuropathy, pulmonary fibrosis was sent from PMD office due to near syncope and hypotension. . Of Note pt is s/p syncope 05/2018, seen by cardiology,  s/p Loop recorder on last admission     A/P    >Near syncope - likely due to dehydration   orthostatic vitals  gentle hydration   serial troponin negative   TTE LVEF of 60-65%  Pt eval pending   cardiology consulted by ED - s/p Loop 05/2018    >CT chest Moderate chronic interstitial fibrosis. No definite superimposed segmental consolidations - pneumonia ruled out   stop abx  crackles are likely due to pulmonary fibrosis     >THOM -  improving   f/u BMP in am   holding ARB     >HTN - normotensive   hold antihypertensive, monitor BP    >DM complicated with peripheral neuropathy - c/w lantus and humalog  c/w Lyrica  A1c 6.4    >HLD- c/w statin   LDL 15    >Pulmonary fibrosis - c/w Prednisone   CT chest Moderate chronic interstitial fibrosis. No definite superimposed segmental consolidations    >RA- c/w home medication     >GERD - PPI    >BPH- c/w Flomax    >DVT PPX- Heparin

## 2018-06-21 NOTE — CONSULT NOTE ADULT - SUBJECTIVE AND OBJECTIVE BOX
CARDIOLOGY CONSULTATION NOTE   Consult requested by:      Reason for Consultation:     History obtained by: Patient, family and medical record     obtained: No    Chief complaint:    Patient is a 83y old  Male who presents with a chief complaint of near syncope, hypotension (2018 15:47)      HPI:    82M hx DM, HTN, CKD stage II, prior meningitis 2yrs ago, RA on prednisone, chronic LE edema/lymphedema,   presenting for evaluation of syncope. Pt had  EP eval in the past and underwent s/p ILR implant (18) ILR reviews showed AF episodes EGM c/w SR/APCs. Tachy episodes most c/w NCT/SVT and NSVT.   Pt deferred EP at that time.   Patient was sent to ED from PMD office due to near syncope and hypotension.   Per patient he went to see primary care, in office noted to have hypotensive 90/60 and also c/o dizziness and felt like he is going to passed out, called 911 and send to ER. In ER he was noted to be hypotensive, receive 1L NS bolus. At the time of exam he report he is feeling better, poor oral intake for lat few days. He also c/o cough, denied chest pain, fever, chills. Work up showed Bun 31, Cr1.69, x-ray chest Increased reticulations in the bilateral lower lobes left greater than  right more prominent than the prior exam may represent more progressive  fibrosis, clinically correlate for superimposed left lower lobe pneumonia.    PRIOR CARDIAC TESTING  EKG: 3/15/18, sinus rhtyhm 70 bpm, PVC   Stress Test: 17, normal, unable to exercise/ walks with walker. Small mild defect in basal inferior wall predominantly fixed suggestive of attenuation artifact. LVEF 59%.   Echo: 17, LVEF 60%, nml biatrial size, mild MR, mod TR          REVIEW OF SYMPTOMS:   Constitutional: Denied: fever, chills, weight loss or gain  Eyes: Denied: reddened ayes, eye discharge, eye pain  ENMT: Denied: ear pain, nasal discharge, mouth pain, throat pain or swelling  Cardiovascular: Denied: chest pain,  Chest pressure, palpitation, arrhythmia, irregular or fast heart beats, edema  Respiratory: Denied: cough, phlegm production, wheezes, dyspnea, orthopnea, PND,   GI: Denied: Abdominal pain, nausea, vomiting, diarrhea, constipation, melena, rectal bleed  : Denied: hematuria, frequency  Musculoskeletal: Denied: Muscle aches, weakness, pain  Hematology/lymp: Denied: Bleeding disorder, anemia, blood clotting  Neuro: Denied: Headache,numbness, aphasia, dysarthria, seizure,  ssych: Denied: depression, anxiety  Integumentary/skin: Denied: rash, bruises, ecchymosis, itching  Allergy/Immunology: denied environmental or drug allergies    ALL OTHER REVIEW OF SYSTEMS ARE NEGATIVE.    MEDICATIONS  (STANDING):  aspirin enteric coated 81 milliGRAM(s) Oral daily  dextrose 5%. 1000 milliLiter(s) (50 mL/Hr) IV Continuous <Continuous>  dextrose 50% Injectable 12.5 Gram(s) IV Push once  dextrose 50% Injectable 25 Gram(s) IV Push once  dextrose 50% Injectable 25 Gram(s) IV Push once  DULoxetine 30 milliGRAM(s) Oral daily  heparin  Injectable 5000 Unit(s) SubCutaneous every 8 hours  insulin glargine Injectable (LANTUS) 15 Unit(s) SubCutaneous at bedtime  insulin lispro (HumaLOG) corrective regimen sliding scale   SubCutaneous three times a day before meals  pantoprazole    Tablet 40 milliGRAM(s) Oral before breakfast  predniSONE   Tablet 2.5 milliGRAM(s) Oral daily  pregabalin 75 milliGRAM(s) Oral two times a day  simvastatin 20 milliGRAM(s) Oral at bedtime  sodium chloride 0.9%. 1000 milliLiter(s) (100 mL/Hr) IV Continuous <Continuous>  tamsulosin 0.4 milliGRAM(s) Oral at bedtime    MEDICATIONS  (PRN):  acetaminophen   Tablet 650 milliGRAM(s) Oral every 6 hours PRN fever/pain  ALBUTerol/ipratropium for Nebulization 3 milliLiter(s) Nebulizer every 6 hours PRN Bronchospasm  dextrose 40% Gel 15 Gram(s) Oral once PRN Blood Glucose LESS THAN 70 milliGRAM(s)/deciliter  glucagon  Injectable 1 milliGRAM(s) IntraMuscular once PRN Glucose LESS THAN 70 milligrams/deciliter  guaiFENesin    Syrup 100 milliGRAM(s) Oral every 6 hours PRN Cough        PAST MEDICAL & SURGICAL HISTORY:  Syncope, unspecified syncope type  Skin cancer  Peripheral neuropathy  GERD (gastroesophageal reflux disease)  History of hypertension  Diabetes  Enlarged prostate  High cholesterol  Rheumatoid arthritis  Nerve damage  Chronic back pain  Hiatal hernia with gastroesophageal reflux      FAMILY HISTORY:  No pertinent family history in first degree relatives      SOCIAL HISTORY:    CIGARETTES:  No    ALCOHOL: No    DRUGS: No    Vital Signs Last 24 Hrs  T(C): 36.5 (2018 11:20), Max: 36.6 (2018 23:24)  T(F): 97.7 (2018 11:20), Max: 97.8 (2018 23:24)  HR: 72 (2018 11:20) (63 - 74)  BP: 132/72 (2018 11:20) (100/68 - 156/60)  BP(mean): --  RR: 18 (2018 11:20) (16 - 22)  SpO2: 98% (2018 23:24) (97% - 98%)    PHYSICAL EXAM:      Constitutional: No fever, chills, NAD, Comfortable    Eyes: Not reddened, no discharge    ENMT: No discharge, No pain    Neck: No JVD, No Bruit    Back: No CVA tenderness    Respiratory: Clear to auscultation bilaterally    Cardiovascular: RRR, Normal S1-2, No S3-, No friction rub murmur    Gastrointestinal: Soft, NT/ND. BS+, No organomegaly    Extremities: No edema    Vascular: Distal pulses intact    Neurological: Alert, awake, oriented, able to move extremities, speach is clear    Skin: No ecchymosis, no rash    Musculoskeletal: Non tender, no weaknss    Psychiatric: Aproppriate mood, no anxiety        INTERPRETATION OF TELEMETRY:    ECG: P    I&O's Detail    2018 07:01  -  2018 07:00  --------------------------------------------------------  IN:  Total IN: 0 mL    OUT:    Voided: 500 mL  Total OUT: 500 mL    Total NET: -500 mL          LABS:                        10.3   9.5   )-----------( 221      ( 2018 06:36 )             30.5     06-21    142  |  106  |  21.0<H>  ----------------------------<  117<H>  3.9   |  23.0  |  1.16    Ca    8.3<L>      2018 06:39    TPro  6.8  /  Alb  3.8  /  TBili  0.3<L>  /  DBili  x   /  AST  15  /  ALT  6   /  AlkPhos  53  06-20    CARDIAC MARKERS ( 2018 06:39 )  x     / 0.03 ng/mL / x     / x     / x      CARDIAC MARKERS ( 2018 22:26 )  x     / 0.03 ng/mL / x     / x     / x      CARDIAC MARKERS ( 2018 13:10 )  x     / 0.04 ng/mL / x     / x     / x            Urinalysis Basic - ( 2018 19:09 )    Color: Yellow / Appearance: Clear / S.015 / pH: x  Gluc: x / Ketone: Trace  / Bili: Negative / Urobili: Negative mg/dL   Blood: x / Protein: 15 mg/dL / Nitrite: Negative   Leuk Esterase: Negative / RBC: 0-2 /HPF / WBC 0-2   Sq Epi: x / Non Sq Epi: Occasional / Bacteria: Negative      I&O's Summary    2018 07:01  -  2018 07:00  --------------------------------------------------------  IN: 0 mL / OUT: 500 mL / NET: -500 mL

## 2018-06-21 NOTE — CONSULT NOTE ADULT - ASSESSMENT
82M hx DM, HTN, CKD stage II, prior meningitis 2yrs ago, RA on prednisone, chronic LE edema/lymphedema,   presenting for evaluation of syncope. Pt had  EP eval in the past and underwent s/p ILR implant (5/4/18) ILR reviews showed AF episodes EGM c/w SR/APCs. Tachy episodes most c/w NCT/SVT and NSVT.   Pt deferred EP at that time.   Patient was sent to ED from PMD office due to near syncope and hypotension.   Per patient he went to see primary care, in office noted to have hypotensive 90/60 and also c/o dizziness and felt like he is going to passed out, called 911 and send to ER. In ER he was noted to be hypotensive, receive 1L NS bolus. At the time of exam he report he is feeling better, poor oral intake for lat few days. He also c/o cough, denied chest pain, fever, chills. Work up showed Bun 31, Cr1.69, x-ray chest Increased reticulations in the bilateral lower lobes left greater than  right more prominent than the prior exam may represent more progressive  fibrosis, clinically correlate for superimposed left lower lobe pneumonia.          A/P    Near syncope  Dehydration   orthostatic vitals  gentle hydration   Trop Neg  TTE LVEF of 60-65%    s/p Loop 05/2018  Device chesk    NSVT  Cont BB     THOM -  improving   Agree holding ARB   Trend BMP    HTN   Check BP

## 2018-06-21 NOTE — PHYSICAL THERAPY INITIAL EVALUATION ADULT - ASSISTIVE DEVICE FOR TRANSFER: GAIT, REHAB EVAL
distance limited due to dizziness/lightheadedness that resolves with return to bed. RN aware./rolling walker

## 2018-06-21 NOTE — CONSULT NOTE ADULT - SUBJECTIVE AND OBJECTIVE BOX
[employer] Loop Recorder implanted by Dr. Hodge on 5/3/18  Battery: SHARMAINE  R wave: 0.45mV  Episodes: Multiple episodes of narrow complex tachycardia recorded. Rates of tachycardia up to 170bpm. Events DO NOT correlate with near syncope. Patient denies palpitations. Longest duration of tachycardia 4 minutes in duration. See chart for detailed printout  Impression: Excellent loop recorder function.

## 2018-06-21 NOTE — PROGRESS NOTE ADULT - SUBJECTIVE AND OBJECTIVE BOX
Internal Medicine Hospitalist - Dr. Ghada JAUREGUI    [id number]    83y      Male    Patient is a 83y old  Male who presents with a chief complaint of near syncope, hypotension (2018 15:47)    INTERVAL HPI/ OVERNIGHT EVENTS: Patient is seen and examined, feeling better, denied chest pain, SOB, dizziness    REVIEW OF SYSTEMS:    Denied fever, chills, abd. pain, nausea, vomiting, chest pain, SOB, headache, dizziness    PHYSICAL EXAM:    Vital Signs Last 24 Hrs  T(C): 36.3 (2018 04:40), Max: 36.6 (2018 23:24)  T(F): 97.4 (2018 04:40), Max: 97.8 (2018 23:24)  HR: 64 (2018 04:40) (63 - 74)  BP: 122/- (2018 04:40) (100/68 - 156/60)  BP(mean): --  RR: 18 (2018 04:40) (16 - 22)  SpO2: 98% (2018 23:24) (97% - 98%)    GENERAL: NAD  CHEST/LUNG: positive crackles over bases   HEART: S1S2+ audible  ABDOMEN: Soft, Nontender, Nondistended; Bowel sounds present  EXTREMITIES:  no edema  CNS: AAO X 3  Psychiatry: normal mood    LABS:                        10.3   9.5   )-----------( 221      ( 2018 06:36 )             30.5     06-21    142  |  106  |  21.0<H>  ----------------------------<  117<H>  3.9   |  23.0  |  1.16    Ca    8.3<L>      2018 06:39    TPro  6.8  /  Alb  3.8  /  TBili  0.3<L>  /  DBili  x   /  AST  15  /  ALT  6   /  AlkPhos  53  06-20      Urinalysis Basic - ( 2018 19:09 )    Color: Yellow / Appearance: Clear / S.015 / pH: x  Gluc: x / Ketone: Trace  / Bili: Negative / Urobili: Negative mg/dL   Blood: x / Protein: 15 mg/dL / Nitrite: Negative   Leuk Esterase: Negative / RBC: 0-2 /HPF / WBC 0-2   Sq Epi: x / Non Sq Epi: Occasional / Bacteria: Negative          MEDICATIONS  (STANDING):  aspirin enteric coated 81 milliGRAM(s) Oral daily  dextrose 5%. 1000 milliLiter(s) (50 mL/Hr) IV Continuous <Continuous>  dextrose 50% Injectable 12.5 Gram(s) IV Push once  dextrose 50% Injectable 25 Gram(s) IV Push once  dextrose 50% Injectable 25 Gram(s) IV Push once  DULoxetine 30 milliGRAM(s) Oral daily  heparin  Injectable 5000 Unit(s) SubCutaneous every 8 hours  insulin glargine Injectable (LANTUS) 15 Unit(s) SubCutaneous at bedtime  insulin lispro (HumaLOG) corrective regimen sliding scale   SubCutaneous three times a day before meals  pantoprazole    Tablet 40 milliGRAM(s) Oral before breakfast  predniSONE   Tablet 2.5 milliGRAM(s) Oral daily  pregabalin 75 milliGRAM(s) Oral two times a day  simvastatin 20 milliGRAM(s) Oral at bedtime  sodium chloride 0.9%. 1000 milliLiter(s) (100 mL/Hr) IV Continuous <Continuous>  tamsulosin 0.4 milliGRAM(s) Oral at bedtime    MEDICATIONS  (PRN):  acetaminophen   Tablet 650 milliGRAM(s) Oral every 6 hours PRN fever/pain  ALBUTerol/ipratropium for Nebulization 3 milliLiter(s) Nebulizer every 6 hours PRN Bronchospasm  dextrose 40% Gel 15 Gram(s) Oral once PRN Blood Glucose LESS THAN 70 milliGRAM(s)/deciliter  glucagon  Injectable 1 milliGRAM(s) IntraMuscular once PRN Glucose LESS THAN 70 milligrams/deciliter  guaiFENesin    Syrup 100 milliGRAM(s) Oral every 6 hours PRN Cough      RADIOLOGY & ADDITIONAL TEST    < from: CT Chest No Cont (18 @ 16:33) >    IMPRESSION:         Moderate chronic interstitial fibrosis. No definite superimposed   segmental consolidations.    < end of copied text >    < from: TTE Echo Complete w/Doppler (18 @ 17:12) >    Summary:   1. Endocardial visualization was enhanced with intravenous echo contrast.   2. Normal biventricular systolic function. Left ventricular ejection   fraction, by visual estimation, is 60 to 65%.   3. Spectral Doppler shows impaired relaxation pattern of left   ventricular myocardial filling (Grade I diastolic dysfunction).   4. Mild-moderate tricuspid regurgitation.   5. Estimated pulmonary artery systolic pressure is 40.0 mmHg assuming a   right atrial pressure of 3 mmHg, which is consistent with borderline   pulmonary hypertension.   6. No pericardial effusion.   7. ** No prior echocardiograms available for comparison.    < end of copied text >

## 2018-06-21 NOTE — PROGRESS NOTE ADULT - SUBJECTIVE AND OBJECTIVE BOX
JAUREGUITALAT ZIMMERMAN  83y  Male      Patient is a 83y old  Male who presents with a chief complaint of near syncope, hypotension (2018 15:47)    83Y man with PMH of DM, HTN, BPH, GERD, HLD, RA, diabetes peripheral neuropathy, pulmonary fibrosis was sent from PMD office due to near syncope. Per patient he went to see primary care, in office noted to have hypotensive 90/60 and also c/o dizziness and felt like he is going to passed out, called 911 and send to ER.  Work up showed Bun 31, Cr1.69, x-ray chest Increased reticulations in the bilateral lower lobes left greater than  right more prominent than the prior exam may represent more progressive  fibrosis, clinically correlate for superimposed left lower lobe pneumonia. Of Note pt is s/p syncope 2018, seen by cardiology,  s/p Loop recorder on last admission     MEDICATIONS  (STANDING):  aspirin enteric coated 81 milliGRAM(s) Oral daily  dextrose 5%. 1000 milliLiter(s) (50 mL/Hr) IV Continuous <Continuous>  dextrose 50% Injectable 12.5 Gram(s) IV Push once  dextrose 50% Injectable 25 Gram(s) IV Push once  dextrose 50% Injectable 25 Gram(s) IV Push once  DULoxetine 30 milliGRAM(s) Oral daily  heparin  Injectable 5000 Unit(s) SubCutaneous every 8 hours  insulin glargine Injectable (LANTUS) 15 Unit(s) SubCutaneous at bedtime  insulin lispro (HumaLOG) corrective regimen sliding scale   SubCutaneous three times a day before meals  pantoprazole    Tablet 40 milliGRAM(s) Oral before breakfast  predniSONE   Tablet 2.5 milliGRAM(s) Oral daily  pregabalin 75 milliGRAM(s) Oral two times a day  simvastatin 20 milliGRAM(s) Oral at bedtime  sodium chloride 0.9%. 1000 milliLiter(s) (100 mL/Hr) IV Continuous <Continuous>  tamsulosin 0.4 milliGRAM(s) Oral at bedtime    MEDICATIONS  (PRN):  acetaminophen   Tablet 650 milliGRAM(s) Oral every 6 hours PRN fever/pain  ALBUTerol/ipratropium for Nebulization 3 milliLiter(s) Nebulizer every 6 hours PRN Bronchospasm  dextrose 40% Gel 15 Gram(s) Oral once PRN Blood Glucose LESS THAN 70 milliGRAM(s)/deciliter  glucagon  Injectable 1 milliGRAM(s) IntraMuscular once PRN Glucose LESS THAN 70 milligrams/deciliter  guaiFENesin    Syrup 100 milliGRAM(s) Oral every 6 hours PRN Cough      Allergies    No Known Allergies          REVIEW OF SYSTEMS:  CONSTITUTIONAL: No fever, weight loss, or fatigue  RESPIRATORY: No cough, wheezing, chills or hemoptysis; No shortness of breath  CARDIOVASCULAR: No chest pain, palpitations, dizziness, or leg swelling  GASTROINTESTINAL: No abdominal or epigastric pain. No nausea, vomiting, or hematemesis; No diarrhea or constipation. No melena or hematochezia.  GENITOURINARY: No dysuria, frequency, hematuria, or incontinence  NEUROLOGICAL: No headaches, memory loss, loss of strength, numbness, or tremors    T(C): 36.3 (18 @ 04:40), Max: 36.6 (18 @ 23:24)  HR: 64 (18 @ 04:40) (63 - 74)  BP: 122/- (18 @ 04:40) (100/68 - 156/60)  RR: 18 (18 @ 04:40) (16 - 22)  SpO2: 98% (18 @ 23:24) (97% - 98%)  Wt(kg): --Vital Signs Last 24 Hrs  T(C): 36.3 (2018 04:40), Max: 36.6 (2018 23:24)  T(F): 97.4 (2018 04:40), Max: 97.8 (2018 23:24)  HR: 64 (2018 04:40) (63 - 74)  BP: 122/- (2018 04:40) (100/68 - 156/60)  BP(mean): --  RR: 18 (2018 04:40) (16 - 22)  SpO2: 98% (2018 23:24) (97% - 98%)  I&O's Summary    2018 07:01  -  2018 07:00  --------------------------------------------------------  IN: 0 mL / OUT: 500 mL / NET: -500 mL        PHYSICAL EXAM:  GENERAL: NAD, well-groomed, well-developed  ENMT: Dry mucous membranes  NERVOUS SYSTEM:  Alert & Oriented X3, Good concentration; Motor Strength 5/5 B/L upper and lower extremities; DTRs 2+ intact and symmetric  CHEST/LUNG: Clear to percussion bilaterally; +bibasilar crackles No rhonchi, wheezing, or rubs  HEART: Regular rate and rhythm; No murmurs, rubs, or gallops  EXTREMITIES:  2+ Peripheral Pulses, No clubbing, cyanosis, or edema      Consultant(s) Notes Reviewed:  [x ] YES  [ ] NO  Care Discussed with Consultants/Other Providers [ x] YES  [ ] NO    LABS:                        10.3   9.5   )-----------( 221      ( 2018 06:36 )             30.5     06-21    142  |  106  |  21.0<H>  ----------------------------<  117<H>  3.9   |  23.0  |  1.16    Ca    8.3<L>      2018 06:39    TPro  6.8  /  Alb  3.8  /  TBili  0.3<L>  /  DBili  x   /  AST  15  /  ALT  6   /  AlkPhos  53  06-20      Urinalysis Basic - ( 2018 19:09 )    Color: Yellow / Appearance: Clear / S.015 / pH: x  Gluc: x / Ketone: Trace  / Bili: Negative / Urobili: Negative mg/dL   Blood: x / Protein: 15 mg/dL / Nitrite: Negative   Leuk Esterase: Negative / RBC: 0-2 /HPF / WBC 0-2   Sq Epi: x / Non Sq Epi: Occasional / Bacteria: Negative      CAPILLARY BLOOD GLUCOSE      POCT Blood Glucose.: 124 mg/dL (2018 08:23)  POCT Blood Glucose.: 147 mg/dL (2018 22:07)  POCT Blood Glucose.: 188 mg/dL (2018 18:08)        Urinalysis Basic - ( 2018 19:09 )    Color: Yellow / Appearance: Clear / S.015 / pH: x  Gluc: x / Ketone: Trace  / Bili: Negative / Urobili: Negative mg/dL   Blood: x / Protein: 15 mg/dL / Nitrite: Negative   Leuk Esterase: Negative / RBC: 0-2 /HPF / WBC 0-2   Sq Epi: x / Non Sq Epi: Occasional / Bacteria: Negative        RADIOLOGY & ADDITIONAL TESTS:   EXAM:  CT CHEST                          PROCEDURE DATE:  2018          INTERPRETATION:  Exam Date: 2018 4:33 PM  Clinical Information: Cough  Technique:       CT of the chest was performed with axial images obtained   from the thoracic to the inlet to the bilateral adrenal glands       without IV contrast. Maximum intensity projection images were obtained.  Comparison:  CT abdomen 2014    FINDINGS:    Lungs, Airways and Pleura: Central tracheobronchial tree demonstrates   multiple mucosal debris within the trachea and right mainstem bronchus.   Interlobular and intralobular septal thickening with peripheral   subpleural cystic change and honeycombing most prominent in the lung   bases compatible with moderate interstitial fibrosis. No segmental   consolidations. No pneumothorax. No pulmonary edema. Right upper lobe   nodule measuring 0.3 cm. No pleural effusions.     Heart and Great Vessels: Atherosclerotic changes of the aorta and   coronary vasculature. Heart is normal in size. No pericardial effusions.    Mediastinum and Rekha: within normal limits    Neck and Chest Wall: within normal limits    Bones: Degenerative changes and osteopenia.    Upper Abdomen:  Gallstones.    IMPRESSION:         Moderate chronic interstitial fibrosis. No definite superimposed   segmental consolidations.                MUNIRA LANG M.D., ATTENDING RADIOLOGIST  This document has been electronically signed. 2018  4:47PM          EXAM:  ECHO TRANSTHORACIC COMP W DOPP      PROCEDURE DATE:  2018   .      INTERPRETATION:  REPORT:    TRANSTHORACIC ECHOCARDIOGRAM REPORT         Patient Name:   TALAT JAUREGUI Patient Location: Inpatient  Medical Rec #:  [id number]        Accession #:      [id number]  Account #:                      Height:           66.9 in 170.0 cm  YOB: 1935       Weight:           165.3 lb 75.00 kg  Patient Age:    83 years        BSA:              1.86 m²  Patient Gender: M               BP:               100/68 mmHg       Date of Exam: 2018 5:12:44 PM  Sonographer:  George Barney    Procedure:     2D Echo/Doppler/Color Doppler Complete.  Indications:   Syncope and collapse - R55  Diagnosis:     Syncope and collapse - R55  Study Details: Technically adequate study.         2D AND M-MODE MEASUREMENTS (normal ranges within parentheses):  Left                Normal    Aorta/Left           Normal  Ventricle:                    Atrium:  IVSd (2D):    1.12  (0.7-1.1) Aortic Root  2.30 cm (2.4-3.7)                cm              (2D):  LVPWd (2D):   1.22  (0.7-1.1) Left Atrium  2.82 cm (1.9-4.0)                cm              (2D):  LVIDd (2D):   3.95  (3.4-5.7) LA Volume    24.2                cm              Index        ml/m²  LVIDs (2D):   2.54                cm  LV FS (2D):   35.7  (>25%)                %  Relative Wall 0.62  (<0.42)  Thickness    LV DIASTOLIC FUNCTION:  MV Peak E: 0.65 m/s e', MV Ella: 0.07 m/s  MV Peak A: 0.99 m/s E/e' Ratio: 9.24  E/A Ratio: 0.65    SPECTRAL DOPPLER ANALYSIS (where applicable):  LVOT Vmax:  LVOT VTI:  LVOT Diameter: 1.97 cm    Tricuspid Valve and PA/RV Systolic Pressure: TR Max Velocity: 3.04 m/s RA   Pressure: 3 mmHg RVSP/PASP: 40.0 mmHg       PHYSICIAN INTERPRETATION:  Left Ventricle: Endocardial visualization was enhanced with intravenous   echo contrast. The left ventricular internal cavity size is normal. There   are no regional wall motion abnormalities. Global LV systolic function   was normal. Left ventricular ejection fraction, by visual estimation, is   60 to 65%. Spectral Doppler shows impaired relaxation pattern of left   ventricular myocardial filling (Grade I diastolic dysfunction).  Right Ventricle: The right ventricular size is normal. RV systolic   function is normal.  Left Atrium: The left atrium is normal in size.  Right Atrium: The right atrium is normal in size.  Pericardium: There is no evidence of pericardial effusion. A fat pad is   present.  Mitral Valve: The mitral valve is normal in structure. Mitral leaflet   mobility is normal. No evidence of mitral stenosis. Trace mitral valve   regurgitation is seen.  Tricuspid Valve: The tricuspid valve is normal. Mild-moderate tricuspid   regurgitation is visualized. Estimated pulmonary artery systolic pressure   is 40.0 mmHg assuming a right atrial pressure of 3 mmHg, which is   consistent with borderline pulmonary hypertension.  Aortic Valve: The aortic valve is trileaflet. No evidence of aortic   stenosis. Trivial aortic valve regurgitation is seen.  Pulmonic Valve: The pulmonic valve was not well visualized. No indication   of pulmonic valve regurgitation.  Aorta: The visualized portions of the aortic root are normal in size and   structure.  Pulmonary Artery: The main pulmonary artery is normal in size.  Venous: The inferior vena cava was normal sized, with respiratory size   variation greater than 50%.       Summary:   1. Endocardial visualization was enhanced with intravenous echo contrast.   2. Normal biventricular systolic function. Left ventricular ejection   fraction, by visual estimation, is 60 to 65%.   3. Spectral Doppler shows impaired relaxation pattern of left   ventricular myocardial filling (Grade I diastolic dysfunction).   4. Mild-moderate tricuspid regurgitation.   5. Estimated pulmonary artery systolic pressure is 40.0 mmHg assuming a   right atrial pressure of 3 mmHg, which is consistent with borderline   pulmonary hypertension.   6. No pericardial effusion.   7. ** No prior echocardiograms available for comparison.    [id number] Jayne House DO, Electronically signed on 2018 at   7:57:30 AM              *** Final ***                  JAYNE HOUSE   This document has been electronically signed. 2018  5:12PM              Imaging Personally Reviewed:  [x] YES  [ ] NO

## 2018-06-21 NOTE — CONSULT NOTE ADULT - ASSESSMENT
Patient seen strips reviewed isolated episodes of NCT likely Atrial Tach   No events correlating to syncope   Agree with optimizing BB rxn  ct to follow know

## 2018-06-21 NOTE — PROGRESS NOTE ADULT - ASSESSMENT
83Y man with PMH of DM, HTN, BPH, GERD, HLD, RA, diabetes peripheral neuropathy, pulmonary fibrosis was sent from PMD office due to near syncope. Per patient he went to see primary care, in office noted to have hypotensive 90/60 and also c/o dizziness and felt like he is going to passed out, called 911 and send to ER.  Work up showed Bun 31, Cr1.69, x-ray chest Increased reticulations in the bilateral lower lobes left greater than  right more prominent than the prior exam may represent more progressive  fibrosis, clinically correlate for superimposed left lower lobe pneumonia. Of Note pt is s/p syncope 05/2018, seen by cardiology,  s/p Loop recorder on last admission     A/P    >Near syncope - likely due to dehydration   orthostatic vitals  gentle hydration   serial troponins negative  TTE- EF 60-65%, grade 1 diastolic dysfunction, right atrial pressure 3 consistent with borderline pulmonary hypertension  Pt eval ordered  cardiology consulted- s/p Loop 05/2018    >Pulmonary fibrosis   Xray suggestive of overlying pneumonia, CT chest negative- worsening pulmonary fibrosis seen    >THOM - no previous labs to compare   c/w IVF, BUN/Cr improved  holding ARB     >HTN - hold antihypertensive, monitor BP    >DM complicated with peripheral neuropathy - c/w lantus and humalog  c/w Lyrica  A1c 6.4    >HLD- c/w statin   Lipid profile WNL    >Pulmonary fibrosis - c/w Prednisone     >RA- c/w home medication     >GERD - PPI    >BPH- c/w Flomax    >DVT PPX- Heparin

## 2018-06-22 ENCOUNTER — TRANSCRIPTION ENCOUNTER (OUTPATIENT)
Age: 83
End: 2018-06-22

## 2018-06-22 VITALS
SYSTOLIC BLOOD PRESSURE: 122 MMHG | DIASTOLIC BLOOD PRESSURE: 82 MMHG | TEMPERATURE: 98 F | RESPIRATION RATE: 18 BRPM | HEART RATE: 89 BPM | OXYGEN SATURATION: 97 %

## 2018-06-22 LAB
ANION GAP SERPL CALC-SCNC: 9 MMOL/L — SIGNIFICANT CHANGE UP (ref 5–17)
BUN SERPL-MCNC: 13 MG/DL — SIGNIFICANT CHANGE UP (ref 8–20)
CALCIUM SERPL-MCNC: 8.5 MG/DL — LOW (ref 8.6–10.2)
CHLORIDE SERPL-SCNC: 108 MMOL/L — HIGH (ref 98–107)
CO2 SERPL-SCNC: 23 MMOL/L — SIGNIFICANT CHANGE UP (ref 22–29)
CREAT SERPL-MCNC: 0.83 MG/DL — SIGNIFICANT CHANGE UP (ref 0.5–1.3)
GLUCOSE BLDC GLUCOMTR-MCNC: 114 MG/DL — HIGH (ref 70–99)
GLUCOSE BLDC GLUCOMTR-MCNC: 131 MG/DL — HIGH (ref 70–99)
GLUCOSE SERPL-MCNC: 97 MG/DL — SIGNIFICANT CHANGE UP (ref 70–115)
POTASSIUM SERPL-MCNC: 4 MMOL/L — SIGNIFICANT CHANGE UP (ref 3.5–5.3)
POTASSIUM SERPL-SCNC: 4 MMOL/L — SIGNIFICANT CHANGE UP (ref 3.5–5.3)
SODIUM SERPL-SCNC: 140 MMOL/L — SIGNIFICANT CHANGE UP (ref 135–145)

## 2018-06-22 PROCEDURE — 80061 LIPID PANEL: CPT

## 2018-06-22 PROCEDURE — 99239 HOSP IP/OBS DSCHRG MGMT >30: CPT

## 2018-06-22 PROCEDURE — 71045 X-RAY EXAM CHEST 1 VIEW: CPT

## 2018-06-22 PROCEDURE — 84484 ASSAY OF TROPONIN QUANT: CPT

## 2018-06-22 PROCEDURE — 82962 GLUCOSE BLOOD TEST: CPT

## 2018-06-22 PROCEDURE — 80048 BASIC METABOLIC PNL TOTAL CA: CPT

## 2018-06-22 PROCEDURE — 97116 GAIT TRAINING THERAPY: CPT

## 2018-06-22 PROCEDURE — 99285 EMERGENCY DEPT VISIT HI MDM: CPT | Mod: 25

## 2018-06-22 PROCEDURE — 83880 ASSAY OF NATRIURETIC PEPTIDE: CPT

## 2018-06-22 PROCEDURE — 71250 CT THORAX DX C-: CPT

## 2018-06-22 PROCEDURE — 85027 COMPLETE CBC AUTOMATED: CPT

## 2018-06-22 PROCEDURE — 83036 HEMOGLOBIN GLYCOSYLATED A1C: CPT

## 2018-06-22 PROCEDURE — 80053 COMPREHEN METABOLIC PANEL: CPT

## 2018-06-22 PROCEDURE — 96374 THER/PROPH/DIAG INJ IV PUSH: CPT

## 2018-06-22 PROCEDURE — 93306 TTE W/DOPPLER COMPLETE: CPT

## 2018-06-22 PROCEDURE — 81001 URINALYSIS AUTO W/SCOPE: CPT

## 2018-06-22 PROCEDURE — 93005 ELECTROCARDIOGRAM TRACING: CPT

## 2018-06-22 PROCEDURE — 97163 PT EVAL HIGH COMPLEX 45 MIN: CPT

## 2018-06-22 PROCEDURE — 36415 COLL VENOUS BLD VENIPUNCTURE: CPT

## 2018-06-22 RX ORDER — METOPROLOL TARTRATE 50 MG
2 TABLET ORAL
Qty: 0 | Refills: 0 | COMMUNITY

## 2018-06-22 RX ORDER — DULOXETINE HYDROCHLORIDE 30 MG/1
1 CAPSULE, DELAYED RELEASE ORAL
Qty: 0 | Refills: 0 | COMMUNITY

## 2018-06-22 RX ORDER — METOPROLOL TARTRATE 50 MG
1 TABLET ORAL
Qty: 60 | Refills: 0 | OUTPATIENT
Start: 2018-06-22 | End: 2018-07-21

## 2018-06-22 RX ORDER — FUROSEMIDE 40 MG
1 TABLET ORAL
Qty: 0 | Refills: 0 | COMMUNITY

## 2018-06-22 RX ORDER — ASPIRIN/CALCIUM CARB/MAGNESIUM 324 MG
1 TABLET ORAL
Qty: 0 | Refills: 0 | COMMUNITY

## 2018-06-22 RX ORDER — METOPROLOL TARTRATE 50 MG
25 TABLET ORAL
Qty: 0 | Refills: 0 | Status: DISCONTINUED | OUTPATIENT
Start: 2018-06-22 | End: 2018-06-22

## 2018-06-22 RX ADMIN — PANTOPRAZOLE SODIUM 40 MILLIGRAM(S): 20 TABLET, DELAYED RELEASE ORAL at 05:37

## 2018-06-22 RX ADMIN — HEPARIN SODIUM 5000 UNIT(S): 5000 INJECTION INTRAVENOUS; SUBCUTANEOUS at 05:36

## 2018-06-22 RX ADMIN — DULOXETINE HYDROCHLORIDE 30 MILLIGRAM(S): 30 CAPSULE, DELAYED RELEASE ORAL at 12:04

## 2018-06-22 RX ADMIN — Medication 75 MILLIGRAM(S): at 05:36

## 2018-06-22 RX ADMIN — Medication 2.5 MILLIGRAM(S): at 05:36

## 2018-06-22 RX ADMIN — Medication 25 MILLIGRAM(S): at 09:02

## 2018-06-22 RX ADMIN — Medication 81 MILLIGRAM(S): at 12:05

## 2018-06-22 NOTE — PROGRESS NOTE ADULT - ASSESSMENT
6/22/2018    83Y man with PMH of DM, HTN, BPH, GERD, HLD, RA, diabetes peripheral neuropathy, pulmonary fibrosis was sent from PMD office due to near syncope. Per patient he went to see primary care, in office noted to have hypotensive 90/60 and also c/o dizziness and felt like he is going to pass out, called 911 and send to ER.  Work up showed Bun 31, Cr1.69, x-ray chest Increased reticulations in the bilateral lower lobes left greater than  right more prominent than the prior exam may represent more progressive  fibrosis, clinically correlate for superimposed left lower lobe pneumonia. Of Note pt is s/p syncope 05/2018, seen by cardiology,  s/p Loop recorder on last admission   On today, Patient is sitting up in chair, comfortable, alert, and in NAD. Patient denies, any pain, SOB, Dyspnea, Orthopnea, or chest pain. 6/22/2018    83Y man with PMH of DM, HTN, BPH, GERD, HLD, RA, diabetes peripheral neuropathy, pulmonary fibrosis was sent from PMD office due to near syncope. Per patient he went to see primary care, in office noted to have hypotensive 90/60 and also c/o dizziness and felt like he is going to pass out, called 911 and send to ER.  Work up showed Bun 31, Cr1.69, x-ray chest Increased reticulations in the bilateral lower lobes left greater than  right more prominent than the prior exam may represent more progressive  fibrosis, clinically correlate for superimposed left lower lobe pneumonia. Of Note pt is s/p syncope 05/2018, seen by cardiology,  s/p Loop recorder on last admission   On today, Patient is sitting up in chair, comfortable, alert, and in NAD. Patient denies, any pain, SOB, dypsnea, Orthopnea, or chest pain.    Near syncope - likely due to dehydration   orthostatic vitals  gentle hydration   serial troponin negative   TTE LVEF of 60-65%  Pt eval pending   cardiology consulted by ED - s/p Loop 05/2018    >CT chest Moderate chronic interstitial fibrosis. No definite superimposed segmental consolidations - pneumonia ruled out   stop abx  crackles are likely due to pulmonary fibrosis     >THOM -  improving   f/u BMP in am   holding ARB     >HTN - normotensive   hold antihypertensive, monitor BP    >DM complicated with peripheral neuropathy - c/w lantus and humalog  c/w Lyrica  A1c 6.4    >HLD- c/w statin   LDL 15    >Pulmonary fibrosis - c/w Prednisone   CT chest Moderate chronic interstitial fibrosis. No definite superimposed segmental consolidations    >RA- c/w home medication     >GERD - PPI    >BPH- c/w Flomax    >DVT PPX- Heparin             Electronic Signatures:      	Authored: Progress Note, Subjective and Objective, Assessment and Plan      Last Updated: 6/22/2018    83Y man with PMH of DM, HTN, BPH, GERD, HLD, RA, diabetes peripheral neuropathy, pulmonary fibrosis was sent from PMD office due to near syncope. Per patient he went to see primary care, in office noted to have hypotensive 90/60 and also c/o dizziness and felt like he is going to pass out, called 911 and send to ER.  Work up showed Bun 31, Cr1.69, x-ray chest Increased reticulations in the bilateral lower lobes left greater than  right more prominent than the prior exam may represent more progressive  fibrosis, clinically correlate for superimposed left lower lobe pneumonia. Of Note pt is s/p syncope 05/2018, seen by cardiology,  s/p Loop recorder on last admission   On today, Patient is sitting up in chair, comfortable, alert, and in NAD. Patient denies, any pain, SOB, dypsnea, Orthopnea, or chest pain.    Near syncope - likely due to dehydration   orthostatic vitals  gentle hydration   serial troponin negative   TTE LVEF of 60-65%  Pt eval pending   cardiology consulted by ED - s/p Loop 05/2018    >CT chest Moderate chronic interstitial fibrosis. No definite superimposed segmental consolidations - pneumonia ruled out   stop abx  crackles are likely due to pulmonary fibrosis     >THOM -  improving   f/u BMP in am   holding ARB     >HTN - normotensive   hold antihypertensive, monitor BP    >DM complicated with peripheral neuropathy - c/w lantus and humalog  c/w Lyrica  A1c 6.4    >HLD- c/w statin   LDL 15    >Pulmonary fibrosis - c/w Prednisone   CT chest Moderate chronic interstitial fibrosis. No definite superimposed segmental consolidations    >RA- c/w home medication     >GERD - PPI    >BPH- c/w Flomax    >DVT PPX- Heparin 6/22/2018    83Y man with PMH of DM, HTN, BPH, GERD, HLD, RA, diabetes peripheral neuropathy, pulmonary fibrosis was sent from PMD office due to near syncope. Per patient he went to see primary care, in office noted to have hypotensive 90/60 and also c/o dizziness and felt like he is going to pass out, called 911 and send to ER.  Work up showed Bun 31, Cr1.69, x-ray chest Increased reticulations in the bilateral lower lobes left greater than  right more prominent than the prior exam may represent more progressive  fibrosis, clinically correlate for superimposed left lower lobe pneumonia. Of Note pt is s/p syncope 05/2018, seen by cardiology,  s/p Loop recorder on last admission   On today, Patient is sitting up in chair, comfortable, alert, and in NAD. Patient denies, any pain, SOB, dypsnea, Orthopnea, or chest pain.    Near syncope - likely due to dehydration   orthostatic vitals  gentle hydration   serial troponin negative   TTE LVEF of 60-65%  Pt eval pending   cardiology consulted by ED - s/p Loop 05/2018    >CT chest Moderate chronic interstitial fibrosis. No definite superimposed segmental consolidations - pneumonia ruled out   stop abx  crackles are likely due to pulmonary fibrosis     >THOM -  improving    holding ARB     >HTN - normotensive   hold antihypertensive, monitor BP    >DM complicated with peripheral neuropathy - c/w lantus and humalog  c/w Lyrica  A1c 6.4    >HLD- c/w statin   LDL 15    >Pulmonary fibrosis - c/w Prednisone   CT chest Moderate chronic interstitial fibrosis. No definite superimposed segmental consolidations    >RA- c/w home medication     >GERD - PPI    >BPH- c/w Flomax    >DVT PPX- Heparin 6/22/2018    83Y man with PMH of DM, HTN, BPH, GERD, HLD, RA, diabetes peripheral neuropathy, pulmonary fibrosis was sent from PMD office due to near syncope. Per patient he went to see primary care, in office noted to have hypotensive 90/60 and also c/o dizziness and felt like he is going to pass out, called 911 and send to ER.  Work up showed Bun 31, Cr1.69, x-ray chest Increased reticulations in the bilateral lower lobes left greater than  right more prominent than the prior exam may represent more progressive  fibrosis, clinically correlate for superimposed left lower lobe pneumonia. Of Note pt is s/p syncope 05/2018, seen by cardiology,  s/p Loop recorder on last admission   On today, Patient is sitting up in chair, comfortable, alert, and in NAD. Patient denies, any pain, SOB, dypsnea, Orthopnea, or chest pain.    >Near syncope - likely due to dehydration   repeat orthostatic vitals - negative today   serial troponin negative   TTE LVEF of 60-65%  Pt eval appreciated home with home care  cardiology consult appreciated - discussed with Dr. Lynn - suggest start Metoprolol 25 bid, hold ACEa dn clear to discharge home   appreciate EP input - patient seen strips reviewed isolated episodes of NCT likely Atrial Tach   No events correlating to syncope   Agree with optimizing BB      >CT chest Moderate chronic interstitial fibrosis. No definite superimposed segmental consolidations - pneumonia ruled out   stop abx  crackles are likely due to pulmonary fibrosis     >THOM -  resolved   holding ARB     >HTN - normotensive   start Metoprolol     >DM complicated with peripheral neuropathy - c/w lantus and humalog  c/w Lyrica  A1c 6.4    >HLD- c/w statin   LDL 15    >Pulmonary fibrosis - c/w Prednisone   CT chest Moderate chronic interstitial fibrosis. No definite superimposed segmental consolidations    >RA- c/w home medication     >GERD - PPI    >BPH- c/w Flomax    >DVT PPX- Heparin

## 2018-06-22 NOTE — DISCHARGE NOTE ADULT - MEDICATION SUMMARY - MEDICATIONS TO CHANGE
I will SWITCH the dose or number of times a day I take the medications listed below when I get home from the hospital:    Metoprolol Succinate ER 25 mg oral tablet, extended release  -- 1 tab(s) by mouth once a day    furosemide 40 mg oral tablet  -- 1 tab(s) by mouth once a day

## 2018-06-22 NOTE — PROGRESS NOTE ADULT - ATTENDING COMMENTS
Pt is seen and examined, feeling better, denied chest pain, SOB, dizziness, seen by PT - home with home care   improved renal function  cardiology consult appreciated - discussed with Dr. Lynn - suggest start Metoprolol 25 bid, hold ACE, clear to discharge home   appreciate EP input - patient seen strips reviewed isolated episodes of NCT likely Atrial Tach   No events correlating to syncope   Agree with optimizing BB  discussed with patient and wife present bedside, updated, answer all questions

## 2018-06-22 NOTE — DISCHARGE NOTE ADULT - CARE PLAN
Principal Discharge DX:	Near syncope  Goal:	due to dehydration - resolved  Assessment and plan of treatment:	stop losartan  f/u cardiology  Secondary Diagnosis:	THOM (acute kidney injury)  Goal:	resolved  Assessment and plan of treatment:	hold losartan   Lasix prn  f/u BMP in 1 week  Secondary Diagnosis:	Rheumatoid arthritis  Assessment and plan of treatment:	c/w home medication  Secondary Diagnosis:	Diabetes  Assessment and plan of treatment:	c/w home medication  Secondary Diagnosis:	GERD (gastroesophageal reflux disease)  Assessment and plan of treatment:	c/w home medication  Secondary Diagnosis:	HTN (hypertension)  Assessment and plan of treatment:	Stop losartan   c/e Metoprolol , monitor Bp  f/u primary care  Secondary Diagnosis:	Pulmonary fibrosis  Assessment and plan of treatment:	c/w home medication

## 2018-06-22 NOTE — DISCHARGE NOTE ADULT - HOSPITAL COURSE
This is 83Y man with PMH of DM, HTN, BPH, GERD, HLD, RA, diabetes peripheral neuropathy, pulmonary fibrosis was sent from PMD office due to near syncope. Per patient he went to see primary care, in office noted to have hypotensive 90/60 and also c/o dizziness and felt like he is going to pass out, called 911 and send to ER.  Work up showed Bun 31, Cr1.69, x-ray chest Increased reticulations in the bilateral lower lobes left greater than  right more prominent than the prior exam may represent more progressive  fibrosis, clinically correlate for superimposed left lower lobe pneumonia, CT chest Moderate chronic interstitial fibrosis. No definite superimposed segmental consolidations - pneumonia ruled out. Pt is started on IVF, hold losartan, seen by cardiology and EP, BP is normotensive, improved renal function.  discussed with Dr. Bauman- suggest  Metoprolol 25 bid, hold ACE. also seen by EP patient seen strips reviewed isolated episodes of NCT likely Atrial Tach   No events correlating to syncope. Pt is clear by cardiology to discharge and f/u as an outpatient.  Pt is also seen by PT - home with home care. Discussed in length with patient wife, updated about patient current condition, medication changes  and also informed to use lasix as needed NOT DAILY.      >Near syncope - likely due to dehydration   repeat orthostatic vitals - negative today   serial troponin negative   TTE LVEF of 60-65%  Pt eval appreciated home with home care  cardiology consult appreciated  - suggest start Metoprolol 25 bid, hold ACE and clear to discharge home   appreciate EP input - patient seen strips reviewed isolated episodes of NCT likely Atrial Tach   No events correlating to syncope   Agree with optimizing BB      >CT chest Moderate chronic interstitial fibrosis. No definite superimposed segmental consolidations - pneumonia ruled out   stop abx  crackles are likely due to pulmonary fibrosis     >THOM -  resolved   holding ARB   f/u BMP in 1 week     >HTN - normotensive   c/w Metoprolol 25 bid, monitor BP    time spent 40 minutes

## 2018-06-22 NOTE — DISCHARGE NOTE ADULT - PATIENT PORTAL LINK FT
You can access the [facility] Patient Portal, offered by [facility], by registering with the following website: [email or web address]

## 2018-06-22 NOTE — PROGRESS NOTE ADULT - SUBJECTIVE AND OBJECTIVE BOX
TALAT JAUREGUI Patient is a 83y old  Male who presents with a chief complaint of near syncope, hypotension (2018 15:47)     HPI:  This is 83Y man with PMH of DM, HTN, BPH, GERD, HLD, RA, diabetes peripheral neuropathy, pulmonary fibrosis was sent from PMD office due to near syncope. Per patient he went to see primary care, in office noted to have hypotensive 90/60 and also c/o dizziness and felt like he is going to passed out, called 911 and send to ER. In ER he was noted to be hypotensive, receive 1L NS bolus. At the time of exam he report he is feeling better, poor oral intake for lat few days. He also c/o cough, denied chest pain, fever, chills. Work up showed Bun 31, Cr1.69, x-ray chest Increased reticulations in the bilateral lower lobes left greater than  right more prominent than the prior exam may represent more progressive  fibrosis, clinically correlate for superimposed left lower lobe pneumonia.    Of Note pt is s/p syncope 2018, seen by cardiology,  s/p Loop recorder on last admission     Denied chest pain, SOB, palpitation, abd. pain, nausea, vomiting, headache, numbness, tingling, urinary and bowel complaints. (2018 15:47)    The patient was seen and evaluated   The patient is in no acute distress.  Denied any fever chest pain, palpitations, shortness of breath, abdominal pain, fever, dysuria, cough, edema   Complains of     I&O's Summary    2018 07:01  -  2018 07:00  --------------------------------------------------------  IN: 200 mL / OUT: 1150 mL / NET: -950 mL      Allergies    No Known Allergies    Intolerances      HEALTH ISSUES - PROBLEM Dx:        PAST MEDICAL & SURGICAL HISTORY:  Syncope, unspecified syncope type  Skin cancer  Peripheral neuropathy  GERD (gastroesophageal reflux disease)  History of hypertension  Diabetes  Enlarged prostate  High cholesterol  Rheumatoid arthritis  Nerve damage  Chronic back pain  Hiatal hernia with gastroesophageal reflux          Vital Signs Last 24 Hrs  T(C): 36.6 (2018 07:36), Max: 36.6 (2018 05:09)  T(F): 97.9 (2018 07:36), Max: 97.9 (2018 05:09)  HR: 106 (2018 10:23) (62 - 106)  BP: 114/66 (2018 10:23) (114/66 - 144/73)  BP(mean): --  RR: 18 (2018 07:36) (17 - 18)  SpO2: 94% (2018 09:15) (94% - 99%)T(C): 36.6 (18 @ 07:36), Max: 36.6 (18 @ 05:09)  HR: 106 (18 @ 10:23) (62 - 106)  BP: 114/66 (18 @ 10:23) (114/66 - 144/73)  RR: 18 (18 @ 07:36) (17 - 18)  SpO2: 94% (18 @ 09:15) (94% - 99%)  Wt(kg): --    PHYSICAL EXAM:    GENERAL: NAD, well-groomed, well-developed  HEAD:  Atraumatic, Normocephalic  EYES: EOMI, PERRLA, conjunctiva and sclera clear  ENMT:  Moist mucous membranes,  No lesions  NECK: Supple, No JVD, Normal thyroid  NERVOUS SYSTEM:  Alert & Oriented X3,  Moves upper and lower extremities; DTRs 2+ intact and symmetric  CHEST/LUNG: Clear to auscultation bilaterally; No rales, rhonchi, wheezing,   HEART: Regular rate and rhythm; No murmurs,   ABDOMEN: Soft, Nontender, Nondistended; Bowel sounds present  EXTREMITIES:  Peripheral Pulses, No  cyanosis, or edema  SKIN: No rashes or lesions    acetaminophen   Tablet 650 milliGRAM(s) Oral every 6 hours PRN  ALBUTerol/ipratropium for Nebulization 3 milliLiter(s) Nebulizer every 6 hours PRN  aspirin enteric coated 81 milliGRAM(s) Oral daily  dextrose 40% Gel 15 Gram(s) Oral once PRN  dextrose 5%. 1000 milliLiter(s) IV Continuous <Continuous>  dextrose 50% Injectable 12.5 Gram(s) IV Push once  dextrose 50% Injectable 25 Gram(s) IV Push once  dextrose 50% Injectable 25 Gram(s) IV Push once  DULoxetine 30 milliGRAM(s) Oral daily  glucagon  Injectable 1 milliGRAM(s) IntraMuscular once PRN  guaiFENesin    Syrup 100 milliGRAM(s) Oral every 6 hours PRN  heparin  Injectable 5000 Unit(s) SubCutaneous every 8 hours  insulin glargine Injectable (LANTUS) 15 Unit(s) SubCutaneous at bedtime  insulin lispro (HumaLOG) corrective regimen sliding scale   SubCutaneous three times a day before meals  metoprolol tartrate 25 milliGRAM(s) Oral two times a day  pantoprazole    Tablet 40 milliGRAM(s) Oral before breakfast  predniSONE   Tablet 2.5 milliGRAM(s) Oral daily  pregabalin 75 milliGRAM(s) Oral two times a day  simvastatin 20 milliGRAM(s) Oral at bedtime  tamsulosin 0.4 milliGRAM(s) Oral at bedtime      LABS:                          10.3   9.5   )-----------( 221      ( 2018 06:36 )             30.5     06-22    140  |  108<H>  |  13.0  ----------------------------<  97  4.0   |  23.0  |  0.83    Ca    8.5<L>      2018 06:56    TPro  6.8  /  Alb  3.8  /  TBili  0.3<L>  /  DBili  x   /  AST  15  /  ALT  6   /  AlkPhos  53  06-20    LIVER FUNCTIONS - ( 2018 13:10 )  Alb: 3.8 g/dL / Pro: 6.8 g/dL / ALK PHOS: 53 U/L / ALT: 6 U/L / AST: 15 U/L / GGT: x             CARDIAC MARKERS ( 2018 06:39 )  x     / 0.03 ng/mL / x     / x     / x      CARDIAC MARKERS ( 2018 22:26 )  x     / 0.03 ng/mL / x     / x     / x      CARDIAC MARKERS ( 2018 13:10 )  x     / 0.04 ng/mL / x     / x     / x          Urinalysis Basic - ( 2018 19:09 )    Color: Yellow / Appearance: Clear / S.015 / pH: x  Gluc: x / Ketone: Trace  / Bili: Negative / Urobili: Negative mg/dL   Blood: x / Protein: 15 mg/dL / Nitrite: Negative   Leuk Esterase: Negative / RBC: 0-2 /HPF / WBC 0-2   Sq Epi: x / Non Sq Epi: Occasional / Bacteria: Negative      CAPILLARY BLOOD GLUCOSE      POCT Blood Glucose.: 114 mg/dL (2018 07:33)  POCT Blood Glucose.: 127 mg/dL (2018 22:45)  POCT Blood Glucose.: 136 mg/dL (2018 17:05)  POCT Blood Glucose.: 152 mg/dL (2018 11:42)      RADIOLOGY & ADDITIONAL TESTS:      Consultant notes reviewed    Case discussed with consultant/provider/ family /patient TALAT JAUREGUI Patient is a 83y old  Male who presents with a chief complaint of near syncope, hypotension (2018 15:47)     HPI:  This is 83Y man with PMH of DM, HTN, BPH, GERD, HLD, RA, diabetes peripheral neuropathy, pulmonary fibrosis was sent from PMD office due to near syncope. Per patient he went to see primary care, in office noted to have hypotensive 90/60 and also c/o dizziness and felt like he is going to passed out, called 911 and send to ER. In ER he was noted to be hypotensive, receive 1L NS bolus. At the time of exam he report he is feeling better, poor oral intake for lat few days. He also c/o cough, denied chest pain, fever, chills. Work up showed Bun 31, Cr1.69, x-ray chest Increased reticulations in the bilateral lower lobes left greater than  right more prominent than the prior exam may represent more progressive  fibrosis, clinically correlate for superimposed left lower lobe pneumonia.    Of Note pt is s/p syncope 2018, seen by cardiology,  s/p Loop recorder on last admission     Denied chest pain, SOB, palpitation, abd. pain, nausea, vomiting, headache, numbness, tingling, urinary and bowel complaints. (2018 15:47)    The patient was seen and evaluated   The patient is in no acute distress.  Denied any fever chest pain, palpitations, shortness of breath, abdominal pain, fever, dysuria, cough, edema        I&O's Summary    2018 07:01  -  2018 07:00  --------------------------------------------------------  IN: 200 mL / OUT: 1150 mL / NET: -950 mL      Allergies    No Known Allergies    Intolerances      HEALTH ISSUES - PROBLEM Dx:        PAST MEDICAL & SURGICAL HISTORY:  Syncope, unspecified syncope type  Skin cancer  Peripheral neuropathy  GERD (gastroesophageal reflux disease)  History of hypertension  Diabetes  Enlarged prostate  High cholesterol  Rheumatoid arthritis  Nerve damage  Chronic back pain  Hiatal hernia with gastroesophageal reflux          Vital Signs Last 24 Hrs  T(C): 36.6 (2018 07:36), Max: 36.6 (2018 05:09)  T(F): 97.9 (2018 07:36), Max: 97.9 (2018 05:09)  HR: 106 (2018 10:23) (62 - 106)  BP: 114/66 (2018 10:23) (114/66 - 144/73)  BP(mean): --  RR: 18 (2018 07:36) (17 - 18)  SpO2: 94% (2018 09:15) (94% - 99%)T(C): 36.6 (18 @ 07:36), Max: 36.6 (18 @ 05:09)  HR: 106 (18 @ 10:23) (62 - 106)  BP: 114/66 (18 @ 10:23) (114/66 - 144/73)  RR: 18 (18 @ 07:36) (17 - 18)  SpO2: 94% (18 @ 09:15) (94% - 99%)  Wt(kg): --    PHYSICAL EXAM:    GENERAL: NAD, well-groomed, well-developed  HEAD:  Atraumatic, Normocephalic  EYES: EOMI, PERRLA, conjunctiva and sclera clear  ENMT:  Moist mucous membranes,  No lesions  NECK: Supple, No JVD, Normal thyroid  NERVOUS SYSTEM:  Alert & Oriented X3,  Moves upper and lower extremities; DTRs 2+ intact and symmetric  CHEST/LUNG:  Crepitant rales on auscultation bilaterally; No Rhonchi or wheezing on ausc.   HEART: Regular rate and rhythm; No murmurs,   ABDOMEN: Soft, Nontender, Nondistended; Bowel sounds present  EXTREMITIES:  Peripheral Pulses present 2+ all areas uppers and lowers, No  cyanosis, or edema  SKIN: No rashes or lesions    acetaminophen   Tablet 650 milliGRAM(s) Oral every 6 hours PRN  ALBUTerol/ipratropium for Nebulization 3 milliLiter(s) Nebulizer every 6 hours PRN  aspirin enteric coated 81 milliGRAM(s) Oral daily  dextrose 40% Gel 15 Gram(s) Oral once PRN  dextrose 5%. 1000 milliLiter(s) IV Continuous <Continuous>  dextrose 50% Injectable 12.5 Gram(s) IV Push once  dextrose 50% Injectable 25 Gram(s) IV Push once  dextrose 50% Injectable 25 Gram(s) IV Push once  DULoxetine 30 milliGRAM(s) Oral daily  glucagon  Injectable 1 milliGRAM(s) IntraMuscular once PRN  guaiFENesin    Syrup 100 milliGRAM(s) Oral every 6 hours PRN  heparin  Injectable 5000 Unit(s) SubCutaneous every 8 hours  insulin glargine Injectable (LANTUS) 15 Unit(s) SubCutaneous at bedtime  insulin lispro (HumaLOG) corrective regimen sliding scale   SubCutaneous three times a day before meals  metoprolol tartrate 25 milliGRAM(s) Oral two times a day  pantoprazole    Tablet 40 milliGRAM(s) Oral before breakfast  predniSONE   Tablet 2.5 milliGRAM(s) Oral daily  pregabalin 75 milliGRAM(s) Oral two times a day  simvastatin 20 milliGRAM(s) Oral at bedtime  tamsulosin 0.4 milliGRAM(s) Oral at bedtime      LABS:                          10.3   9.5   )-----------( 221      ( 2018 06:36 )             30.5     06-22    140  |  108<H>  |  13.0  ----------------------------<  97  4.0   |  23.0  |  0.83    Ca    8.5<L>      2018 06:56    TPro  6.8  /  Alb  3.8  /  TBili  0.3<L>  /  DBili  x   /  AST  15  /  ALT  6   /  AlkPhos  53  06-20    LIVER FUNCTIONS - ( 2018 13:10 )  Alb: 3.8 g/dL / Pro: 6.8 g/dL / ALK PHOS: 53 U/L / ALT: 6 U/L / AST: 15 U/L / GGT: x             CARDIAC MARKERS ( 2018 06:39 )  x     / 0.03 ng/mL / x     / x     / x      CARDIAC MARKERS ( 2018 22:26 )  x     / 0.03 ng/mL / x     / x     / x      CARDIAC MARKERS ( 2018 13:10 )  x     / 0.04 ng/mL / x     / x     / x          Urinalysis Basic - ( 2018 19:09 )    Color: Yellow / Appearance: Clear / S.015 / pH: x  Gluc: x / Ketone: Trace  / Bili: Negative / Urobili: Negative mg/dL   Blood: x / Protein: 15 mg/dL / Nitrite: Negative   Leuk Esterase: Negative / RBC: 0-2 /HPF / WBC 0-2   Sq Epi: x / Non Sq Epi: Occasional / Bacteria: Negative      CAPILLARY BLOOD GLUCOSE      POCT Blood Glucose.: 114 mg/dL (2018 07:33)  POCT Blood Glucose.: 127 mg/dL (2018 22:45)  POCT Blood Glucose.: 136 mg/dL (2018 17:05)  POCT Blood Glucose.: 152 mg/dL (2018 11:42)      RADIOLOGY & ADDITIONAL TESTS:      Consultant notes reviewed    Case discussed with consultant/provider/ family /patient

## 2018-06-22 NOTE — DISCHARGE NOTE ADULT - PROVIDER TOKENS
TOKEN:'[email or web address]',TOKEN:'[email or web address]',FREE:[LAST:[Dr. Duffy],PHONE:[(   )    -],FAX:[(   )    -]]

## 2018-06-22 NOTE — DISCHARGE NOTE ADULT - PLAN OF CARE
due to dehydration - resolved stop losartan  f/u cardiology resolved hold losartan   Lasix prn  f/u BMP in 1 week c/w home medication Stop losartan   c/e Metoprolol , monitor Bp  f/u primary care

## 2018-06-22 NOTE — DISCHARGE NOTE ADULT - CARE PROVIDER_API CALL
Kenny Hodge), Cardiology; Internal Medicine  [address] [place] [address]  Phone: [phone]  Fax: [phone]    Gabe Bauman), Cardiovascular Disease  [address] [place] [address]  Phone: [phone]  Fax: [phone]    Dr. Duffy,   Phone: (   )    -  Fax: (   )    -

## 2018-06-22 NOTE — DISCHARGE NOTE ADULT - CARE PROVIDERS DIRECT ADDRESSES
[email or web address]

## 2018-06-22 NOTE — DISCHARGE NOTE ADULT - MEDICATION SUMMARY - MEDICATIONS TO TAKE
I will START or STAY ON the medications listed below when I get home from the hospital:    predniSONE 2.5 mg oral tablet  -- 1 tab(s) by mouth once a day  -- Indication: For home medication    Aspir 81 oral delayed release tablet  -- 1 tab(s) by mouth once a day  -- Indication: For home medication    tamsulosin 0.4 mg oral capsule  -- 1 cap(s) by mouth once a day (at bedtime)  -- Indication: For bph    pregabalin 100 mg oral capsule  -- 1 cap(s) by mouth 2 times a day  -- Indication: For Neuropathy    DULoxetine 30 mg oral delayed release capsule  -- 1 cap(s) by mouth once a day  -- Indication: For home medication    NovoLOG FlexPen 100 units/mL subcutaneous solution  --  subcutaneous   sliding scale at lunch time  -- Indication: For dm    metFORMIN extended release 500 mg oral tablet, extended release  -- 4 tab(s) by mouth once a day (at bedtime)  -- Indication: For dm    insulin detemir 100 units/mL subcutaneous solution  -- 18 unit(s) subcutaneous once a day (at bedtime)  -- Indication: For dm    simvastatin 20 mg oral tablet  -- 1 tab(s) by mouth once a day (at bedtime)  -- Indication: For hld    metoprolol tartrate 25 mg oral tablet  -- 1 tab(s) by mouth 2 times a day hold if SBP is less than 100, HR less than 60  -- Indication: For home medication    furosemide 40 mg oral tablet  -- 1 tab(s) by mouth once a day, As Needed  -- Indication: For as neeeded - LDE edema or swelling     leflunomide 20 mg oral tablet  -- 1 tab(s) by mouth once a day  -- Indication: For home medication    omeprazole 20 mg oral delayed release capsule  -- 1 cap(s) by mouth once a day  -- Indication: For home medication

## 2018-06-22 NOTE — DISCHARGE NOTE ADULT - SECONDARY DIAGNOSIS.
THOM (acute kidney injury) Rheumatoid arthritis Diabetes GERD (gastroesophageal reflux disease) HTN (hypertension) Pulmonary fibrosis

## 2018-07-13 ENCOUNTER — APPOINTMENT (OUTPATIENT)
Dept: ELECTROPHYSIOLOGY | Facility: CLINIC | Age: 83
End: 2018-07-13

## 2018-07-27 ENCOUNTER — MEDICATION RENEWAL (OUTPATIENT)
Age: 83
End: 2018-07-27

## 2018-07-27 PROBLEM — R55 SYNCOPE AND COLLAPSE: Chronic | Status: ACTIVE | Noted: 2018-05-04

## 2018-07-31 RX ORDER — METOPROLOL SUCCINATE 50 MG/1
50 TABLET, EXTENDED RELEASE ORAL DAILY
Qty: 30 | Refills: 1 | Status: ACTIVE | COMMUNITY
Start: 2018-05-17 | End: 1900-01-01

## 2018-08-13 ENCOUNTER — APPOINTMENT (OUTPATIENT)
Dept: ELECTROPHYSIOLOGY | Facility: CLINIC | Age: 83
End: 2018-08-13
Payer: MEDICARE

## 2018-08-13 PROCEDURE — 93298 REM INTERROG DEV EVAL SCRMS: CPT

## 2018-08-13 PROCEDURE — 93299: CPT

## 2018-09-14 ENCOUNTER — APPOINTMENT (OUTPATIENT)
Dept: ELECTROPHYSIOLOGY | Facility: CLINIC | Age: 83
End: 2018-09-14
Payer: MEDICARE

## 2018-09-14 PROCEDURE — 93298 REM INTERROG DEV EVAL SCRMS: CPT

## 2018-09-14 PROCEDURE — 93299: CPT

## 2018-09-20 ENCOUNTER — APPOINTMENT (OUTPATIENT)
Dept: ELECTROPHYSIOLOGY | Facility: CLINIC | Age: 83
End: 2018-09-20
Payer: MEDICARE

## 2018-09-20 VITALS
SYSTOLIC BLOOD PRESSURE: 151 MMHG | WEIGHT: 170 LBS | BODY MASS INDEX: 24.34 KG/M2 | HEIGHT: 70 IN | HEART RATE: 68 BPM | OXYGEN SATURATION: 93 % | DIASTOLIC BLOOD PRESSURE: 83 MMHG

## 2018-09-20 PROCEDURE — 93000 ELECTROCARDIOGRAM COMPLETE: CPT

## 2018-09-20 PROCEDURE — 99215 OFFICE O/P EST HI 40 MIN: CPT

## 2018-10-15 ENCOUNTER — APPOINTMENT (OUTPATIENT)
Dept: ELECTROPHYSIOLOGY | Facility: CLINIC | Age: 83
End: 2018-10-15
Payer: MEDICARE

## 2018-10-15 PROCEDURE — 93298 REM INTERROG DEV EVAL SCRMS: CPT

## 2018-10-15 PROCEDURE — 93299: CPT

## 2018-11-08 ENCOUNTER — APPOINTMENT (OUTPATIENT)
Dept: ELECTROPHYSIOLOGY | Facility: CLINIC | Age: 83
End: 2018-11-08

## 2018-11-16 ENCOUNTER — APPOINTMENT (OUTPATIENT)
Dept: ELECTROPHYSIOLOGY | Facility: CLINIC | Age: 83
End: 2018-11-16
Payer: MEDICARE

## 2018-11-16 PROCEDURE — 93299: CPT

## 2018-11-16 PROCEDURE — 93298 REM INTERROG DEV EVAL SCRMS: CPT

## 2018-12-17 ENCOUNTER — APPOINTMENT (OUTPATIENT)
Dept: ELECTROPHYSIOLOGY | Facility: CLINIC | Age: 83
End: 2018-12-17
Payer: MEDICARE

## 2018-12-17 PROCEDURE — 93298 REM INTERROG DEV EVAL SCRMS: CPT

## 2018-12-17 PROCEDURE — 93299: CPT

## 2019-01-18 ENCOUNTER — APPOINTMENT (OUTPATIENT)
Dept: ELECTROPHYSIOLOGY | Facility: CLINIC | Age: 84
End: 2019-01-18

## 2019-02-22 ENCOUNTER — APPOINTMENT (OUTPATIENT)
Dept: ELECTROPHYSIOLOGY | Facility: CLINIC | Age: 84
End: 2019-02-22

## 2019-03-25 ENCOUNTER — APPOINTMENT (OUTPATIENT)
Dept: ELECTROPHYSIOLOGY | Facility: CLINIC | Age: 84
End: 2019-03-25

## 2019-04-26 ENCOUNTER — APPOINTMENT (OUTPATIENT)
Dept: ELECTROPHYSIOLOGY | Facility: CLINIC | Age: 84
End: 2019-04-26

## 2019-05-31 ENCOUNTER — APPOINTMENT (OUTPATIENT)
Dept: ELECTROPHYSIOLOGY | Facility: CLINIC | Age: 84
End: 2019-05-31

## 2019-06-21 ENCOUNTER — APPOINTMENT (OUTPATIENT)
Dept: ELECTROPHYSIOLOGY | Facility: CLINIC | Age: 84
End: 2019-06-21

## 2019-07-22 ENCOUNTER — APPOINTMENT (OUTPATIENT)
Dept: ELECTROPHYSIOLOGY | Facility: CLINIC | Age: 84
End: 2019-07-22

## 2019-08-23 ENCOUNTER — APPOINTMENT (OUTPATIENT)
Dept: ELECTROPHYSIOLOGY | Facility: CLINIC | Age: 84
End: 2019-08-23

## 2019-09-23 ENCOUNTER — APPOINTMENT (OUTPATIENT)
Dept: ELECTROPHYSIOLOGY | Facility: CLINIC | Age: 84
End: 2019-09-23

## 2019-10-09 NOTE — H&P ADULT - PMH
Assessment/Plan


Problems:  


(1) Atrial fibrillation with rapid ventricular response


(2) Acute exacerbation of CHF (congestive heart failure)


(3) Acute kidney injury superimposed on CKD


(4) Gram positive bacterial infection


(5) DM (diabetes mellitus)


(6) HTN (hypertension)


(7) Hypoxemia


Assessment/Plan


Optimize pulmonary hygiene/mobilize as tolerated


PRN O2


Abx (PO Augmentin) per ID


F/U CARDS/EPS recs ---> PO MTP, Eliquis (held) ---> plan to transfer for cath


Monitor volumes and renal function, renal recs, diuretics held


Glycemic control


Aspiration precautions


DVT Px: Eliquis (held), + SCD's


FC


Awaiting transfer to OSH for LHC





Subjective


Allergies:  


Coded Allergies:  


     No Known Allergies (Unverified , 6/2/16)


Subjective


AFVSS in SR


O2 needs stable


no cough less SOB no CP no FC


Awaiting transfer to OSH for LHC





Objective





Last 24 Hour Vital Signs








  Date Time  Temp Pulse Resp B/P (MAP) Pulse Ox O2 Delivery O2 Flow Rate FiO2


 


10/9/19 13:06    135/68    


 


10/9/19 12:00  72      


 


10/9/19 12:00 98.5 71 20 135/68 (90) 97   


 


10/9/19 08:54  68  130/65    


 


10/9/19 08:53    130/65    


 


10/9/19 08:00 98.7 77 19 130/65 (86) 98   


 


10/9/19 08:00  78      


 


10/9/19 07:55  86 20  97 Nasal Cannula 3.0 32


 


10/9/19 07:55     97 Nasal Cannula 3.0 32


 


10/9/19 05:57    127/62    


 


10/9/19 04:00  74      


 


10/9/19 04:00 98.0 75 18 131/66 (87) 98   


 


10/9/19 00:00  81      


 


10/9/19 00:00 97.6 78 18 110/56 (74) 96   


 


10/8/19 22:52    120/64    


 


10/8/19 21:22  90  114/46    


 


10/8/19 20:25  83 18  97 Nasal Cannula 2.0 28


 


10/8/19 20:05     97 Nasal Cannula 2.0 28


 


10/8/19 20:00  91      


 


10/8/19 20:00 98.1 90 18 114/46 (68) 95   


 


10/8/19 16:00  90      


 


10/8/19 16:00 97.2 96 18 135/68 (90) 96   


 


10/8/19 14:17    120/54    

















Intake and Output  


 


 10/8/19 10/9/19





 19:00 07:00


 


Intake Total  120 ml


 


Output Total  500 ml


 


Balance  -380 ml


 


  


 


Intake Oral  120 ml


 


Output Urine Total  500 ml


 


# Bowel Movements  1








General Appearance:  WD/WN, no acute distress


HEENT:  normocephalic, atraumatic, anicteric, mucous membranes moist


Respiratory/Chest:  rhonchi


Cardiovascular:  normal peripheral pulses, normal rate, regular rhythm


Abdomen:  normal bowel sounds, soft, non tender, no organomegaly, non distended


Extremities:  no cyanosis, no clubbing, no edema


Laboratory Tests


10/8/19 19:44: 


Sodium Level 141, Potassium Level 3.6, Chloride Level 107, Carbon Dioxide Level 

25, Anion Gap 9, Blood Urea Nitrogen 21H, Creatinine 2.0H, Estimat Glomerular 

Filtration Rate , Glucose Level 234H, Calcium Level 8.4L


10/9/19 05:39: 


Sodium Level 141, Potassium Level 4.0, Chloride Level 108H, Carbon Dioxide 

Level 24, Anion Gap 9, Blood Urea Nitrogen 24H, Creatinine 2.1H, Estimat 

Glomerular Filtration Rate , Glucose Level 172H, Calcium Level 8.3L, White 

Blood Count 15.0H, Red Blood Count 3.34L, Hemoglobin 9.4L, Hematocrit 29.1L, 

Mean Corpuscular Volume 87, Mean Corpuscular Hemoglobin 28.2, Mean Corpuscular 

Hemoglobin Concent 32.5, Red Cell Distribution Width 12.8, Platelet Count 256, 

Mean Platelet Volume 7.2, Neutrophils (%) (Auto) 78.4H, Lymphocytes (%) (Auto) 

12.2L, Monocytes (%) (Auto) 7.7, Eosinophils (%) (Auto) 1.3, Basophils (%) (Auto

) 0.4, Erythrocyte Sedimentation Rate 74H, Hemoglobin A1c 8.6H, Uric Acid 8.8H, 

Phosphorus Level 3.7, Magnesium Level 2.0, Iron Level 19L, Total Iron Binding 

Capacity 113L, Percent Iron Saturation 17, Unsaturated Iron Binding 94L, 

Ferritin 313, Total Bilirubin 0.4, Gamma Glutamyl Transpeptidase 198H, 

Aspartate Amino Transf (AST/SGOT) 22, Alanine Aminotransferase (ALT/SGPT) 24, 

Alkaline Phosphatase 163H, C-Reactive Protein, Quantitative 4.4H, Pro-B-Type 

Natriuretic Peptide 33028U, Total Protein 6.0L, Albumin 2.1L, Globulin 3.9, 

Albumin/Globulin Ratio 0.5L, Triglycerides Level 107, Cholesterol Level 50, LDL 

Cholesterol 18, HDL Cholesterol 18L, Cholesterol/HDL Ratio 2.8L, Vitamin B12 

Level 402, Folate 22.5, Thyroid Stimulating Hormone (TSH) 0.050L, Free 

Thyroxine 1.95H, Free Triiodothyronine 2.1L, Digoxin Level 1.5


10/9/19 05:45: Urine Eosinophils None seen





Current Medications








 Medications


  (Trade)  Dose


 Ordered  Sig/Sherly


 Route


 PRN Reason  Start Time


 Stop Time Status Last Admin


Dose Admin


 


 Acetaminophen


  (Tylenol)  500 mg  Q6H  PRN


 ORAL


 PAIN 1-6  10/7/19 22:15


 11/5/19 10:14   


 


 


 Al Hydroxide/Mg


 Hydroxide


  (Mylanta)  30 ml  Q6H  PRN


 ORAL


 nausea/acid reflux  10/8/19 12:00


 11/7/19 11:59   


 


 


 Amiodarone HCl


  (Cordarone)  400 mg  DAILY


 ORAL


   10/10/19 09:00


 11/6/19 20:59   


 


 


 Amoxicillin/


 Clavulanate


 Potassium


  (Augmentin)  500 mg  EVERY 12  HOURS


 ORAL


   10/8/19 21:00


 10/15/19 20:59  10/9/19 08:53


 


 


 Aspirin


  (ASA)  81 mg  DAILY


 ORAL


   10/8/19 09:00


 11/3/19 11:14  10/9/19 08:54


 


 


 Atorvastatin


 Calcium


  (Lipitor)  20 mg  BEDTIME


 ORAL


   10/7/19 21:00


 11/3/19 20:59  10/8/19 21:23


 


 


 Carvedilol


  (Coreg)  25 mg  EVERY 12  HOURS


 ORAL


   10/7/19 21:00


 11/6/19 20:59  10/9/19 08:54


 


 


 Clopidogrel


 Bisulfate


  (Plavix)  75 mg  DAILY


 ORAL


   10/8/19 09:00


 11/3/19 11:14  10/8/19 10:47


 


 


 Dextrose


  (Dextrose 50%)  25 ml  Q30M  PRN


 IV


 Hypoglycemia  10/8/19 20:30


 11/7/19 20:29   


 


 


 Dextrose


  (Dextrose 50%)  50 ml  Q30M  PRN


 IV


 Hypoglycemia  10/8/19 20:30


 11/7/19 20:29   


 


 


 Docusate Sodium


  (Colace)  100 mg  TWICE A  DAY


 ORAL


   10/8/19 18:00


 11/7/19 17:59  10/9/19 08:54


 


 


 Finasteride


  (Proscar)  5 mg  DAILY


 ORAL


   10/8/19 09:00


 11/7/19 08:59  10/9/19 08:53


 


 


 Hydralazine HCl


  (Apresoline)  25 mg  Q8HR


 ORAL


   10/8/19 14:00


 11/7/19 13:59  10/9/19 13:06


 


 


 Insulin Aspart


  (NovoLOG)    BEFORE MEALS AND  HS


 SUBQ


   10/8/19 21:00


 11/7/19 20:59  10/8/19 21:27


 


 


 Insulin Detemir


  (Levemir)  15 units  BEDTIME


 SUBQ


   10/7/19 22:30


 11/3/19 22:29  10/8/19 21:26


 


 


 Insulin Detemir


  (Levemir)  25 units  DAILY


 SUBQ


   10/8/19 09:00


 11/3/19 11:14  10/8/19 11:26


 


 


 Isosorbide


 Mononitrate


  (Imdur)  30 mg  DAILY


 ORAL


   10/8/19 09:00


 11/7/19 08:59  10/9/19 08:53


 


 


 Ondansetron HCl


  (Zofran ODT)  4 mg  Q6H  PRN


 ORAL


 Nausea & Vomiting  10/8/19 10:15


 11/7/19 10:14  10/8/19 17:55


 


 


 Pantoprazole


  (Protonix)  40 mg  EVERY 12  HOURS


 IVP


   10/8/19 21:00


 11/7/19 20:59  10/9/19 08:52


 


 


 Tamsulosin HCl


  (Flomax)  0.4 mg  BID


 ORAL


   10/8/19 18:00


 11/5/19 20:59  10/9/19 08:54


 


 


 Tramadol HCl


  (Ultram)  50 mg  Q6H  PRN


 ORAL


 Severe Pain (Pain Scale 7-10)  10/7/19 22:15


 10/13/19 10:14   


 

















Ace Fraire MD Oct 9, 2019 13:25 Chronic back pain    Diabetes    Enlarged prostate    GERD (gastroesophageal reflux disease)    High cholesterol    History of hypertension    Nerve damage    Peripheral neuropathy    Rheumatoid arthritis    Skin cancer    Syncope, unspecified syncope type

## 2019-10-25 ENCOUNTER — APPOINTMENT (OUTPATIENT)
Dept: ELECTROPHYSIOLOGY | Facility: CLINIC | Age: 84
End: 2019-10-25

## 2019-11-25 ENCOUNTER — APPOINTMENT (OUTPATIENT)
Dept: ELECTROPHYSIOLOGY | Facility: CLINIC | Age: 84
End: 2019-11-25

## 2019-12-27 ENCOUNTER — APPOINTMENT (OUTPATIENT)
Dept: ELECTROPHYSIOLOGY | Facility: CLINIC | Age: 84
End: 2019-12-27

## 2024-05-19 NOTE — ED ADULT TRIAGE NOTE - MEANS OF ARRIVAL
Progress Note    Patient: Abhi Friedman Date: 5/19/2024   male, 79 year old  Admit Date: 5/18/2024   Attending: Abdifatah Rivero MD      Summary:  Abhi Friedman is a 79 year old male who is being seen in follow up for NSTEMI (non-ST elevated myocardial infarction)  (CMD).    Subjective:  No acute overnight event.  Chest pain resolved.  He was resting in bed.  He looks comfortable.  He denies chest pain or shortness of breath currently.    Assessment & Plan:     NSTEMI  History of coronary artery disease status post prior LAD stent  Currently denies chest pain  Troponin already peaked  Cardiology consulted  EKG revealed sinus Vaibhav  2D echo is pending  Plan is to do left heart catheterization  Continue aspirin, lisinopril, metoprolol    Essential hypertension  GERD  Anxiety  Stable  Resume home medications    Obstructive sleep apnea  CPAP during sleep    Class 1 obesity  Body mass index is 31.9 kg/m².    Stable chronic medical problems  Other chronic medical problems are stable at the present.   Will continue chronic management except as otherwise highlighted above.   Further management will depend on the hospital course.    DVT Prophylaxis:  Heparin  Code status: Full Resuscitation  Disposition:  Not medically ready for discharge due to upcoming left heart catheterization           Medications: personally reviewed today in this patient's active orders section of epic  Allergies:   Allergies as of 05/18/2024 - Reviewed 05/18/2024   Allergen Reaction Noted    Ciprofloxacin Nausea & Vomiting 09/18/2023    Molds & smuts Other (See Comments) 05/08/2014     PHYSICAL EXAM:  Visit Vitals  /74 (BP Location: RUE - Right upper extremity, Patient Position: Semi-Viera's)   Pulse 72   Temp 98.1 °F (36.7 °C) (Oral)   Resp 18   Ht 6' (1.829 m)   Wt 106.7 kg (235 lb 3.7 oz)   SpO2 90%   BMI 31.90 kg/m²     General: A & O X 3 in no acute distress, normocephalic/atraumatic, Mood and Affect appropriate  Lungs: Clear to  auscultation bilaterally; no wheezing; on room air  Heart:  Regular rate and rhythm and S1, S2 present  Abdomen: NT/ND; + B.S.; No guarding or rebound; No peritoneal signs  Extremities: Warm; cyanosis absent  Psych: Calm and cooperative     Labs:  Recent Labs   Lab 05/19/24  0507 05/18/24  1259 05/18/24  1048   WBC 6.3 8.1 7.4   RBC 3.78* 4.17* 4.34*   HGB 11.8* 12.9* 13.6   HCT 35.0* 38.1* 39.9    190 195   SEG  --   --  76     Recent Labs   Lab 05/19/24  0507 05/18/24  1048   SODIUM  --  141   POTASSIUM 4.0 4.0   CHLORIDE  --  108   CO2  --  25   BUN  --  14   CREATININE  --  0.95   GLUCOSE  --  111*   CALCIUM  --  9.4   ALBUMIN  --  3.7   AST  --  23   GPT  --  26   BILIRUBIN  --  0.9   ALKPT  --  65   INR  --  1.1       Abdifatah Rivero MD  Hospitalist  5/19/2024 12:34 PM       stretcher

## 2025-03-01 NOTE — H&P PST ADULT - OTHER CARE PROVIDERS
MD Mitul